# Patient Record
Sex: MALE | Race: WHITE | NOT HISPANIC OR LATINO | Employment: FULL TIME | URBAN - METROPOLITAN AREA
[De-identification: names, ages, dates, MRNs, and addresses within clinical notes are randomized per-mention and may not be internally consistent; named-entity substitution may affect disease eponyms.]

---

## 2018-02-15 ENCOUNTER — HOSPITAL ENCOUNTER (EMERGENCY)
Facility: CLINIC | Age: 53
Discharge: HOME | End: 2018-02-15
Attending: EMERGENCY MEDICINE | Admitting: EMERGENCY MEDICINE
Payer: COMMERCIAL

## 2018-02-15 ENCOUNTER — APPOINTMENT (OUTPATIENT)
Dept: GENERAL RADIOLOGY | Facility: CLINIC | Age: 53
End: 2018-02-15
Attending: EMERGENCY MEDICINE
Payer: COMMERCIAL

## 2018-02-15 VITALS
DIASTOLIC BLOOD PRESSURE: 104 MMHG | WEIGHT: 220 LBS | BODY MASS INDEX: 29.8 KG/M2 | RESPIRATION RATE: 16 BRPM | HEIGHT: 72 IN | OXYGEN SATURATION: 96 % | SYSTOLIC BLOOD PRESSURE: 154 MMHG | TEMPERATURE: 97.8 F

## 2018-02-15 DIAGNOSIS — R07.9 CHEST PAIN, UNSPECIFIED TYPE: ICD-10-CM

## 2018-02-15 DIAGNOSIS — F41.9 ANXIETY: ICD-10-CM

## 2018-02-15 DIAGNOSIS — F10.10 ALCOHOL ABUSE: ICD-10-CM

## 2018-02-15 LAB
ANION GAP SERPL CALCULATED.3IONS-SCNC: 11 MMOL/L (ref 3–14)
BASOPHILS # BLD AUTO: 0.1 10E9/L (ref 0–0.2)
BASOPHILS NFR BLD AUTO: 1.1 %
BUN SERPL-MCNC: 4 MG/DL (ref 7–30)
CALCIUM SERPL-MCNC: 8.9 MG/DL (ref 8.5–10.1)
CHLORIDE SERPL-SCNC: 98 MMOL/L (ref 94–109)
CO2 SERPL-SCNC: 24 MMOL/L (ref 20–32)
CREAT SERPL-MCNC: 0.84 MG/DL (ref 0.66–1.25)
DIFFERENTIAL METHOD BLD: ABNORMAL
EOSINOPHIL # BLD AUTO: 0.1 10E9/L (ref 0–0.7)
EOSINOPHIL NFR BLD AUTO: 1.3 %
ERYTHROCYTE [DISTWIDTH] IN BLOOD BY AUTOMATED COUNT: 12.8 % (ref 10–15)
ETHANOL SERPL-MCNC: 0.08 G/DL
GFR SERPL CREATININE-BSD FRML MDRD: >90 ML/MIN/1.7M2
GLUCOSE SERPL-MCNC: 75 MG/DL (ref 70–99)
HCT VFR BLD AUTO: 44.9 % (ref 40–53)
HGB BLD-MCNC: 16.6 G/DL (ref 13.3–17.7)
IMM GRANULOCYTES # BLD: 0 10E9/L (ref 0–0.4)
IMM GRANULOCYTES NFR BLD: 0.4 %
INTERPRETATION ECG - MUSE: NORMAL
LYMPHOCYTES # BLD AUTO: 0.9 10E9/L (ref 0.8–5.3)
LYMPHOCYTES NFR BLD AUTO: 18.5 %
MCH RBC QN AUTO: 32.6 PG (ref 26.5–33)
MCHC RBC AUTO-ENTMCNC: 37 G/DL (ref 31.5–36.5)
MCV RBC AUTO: 88 FL (ref 78–100)
MONOCYTES # BLD AUTO: 0.4 10E9/L (ref 0–1.3)
MONOCYTES NFR BLD AUTO: 8.6 %
NEUTROPHILS # BLD AUTO: 3.3 10E9/L (ref 1.6–8.3)
NEUTROPHILS NFR BLD AUTO: 70.1 %
NRBC # BLD AUTO: 0 10*3/UL
NRBC BLD AUTO-RTO: 0 /100
PLATELET # BLD AUTO: 177 10E9/L (ref 150–450)
POTASSIUM SERPL-SCNC: 4.1 MMOL/L (ref 3.4–5.3)
RBC # BLD AUTO: 5.09 10E12/L (ref 4.4–5.9)
SODIUM SERPL-SCNC: 133 MMOL/L (ref 133–144)
TROPONIN I SERPL-MCNC: <0.015 UG/L (ref 0–0.04)
WBC # BLD AUTO: 4.7 10E9/L (ref 4–11)

## 2018-02-15 PROCEDURE — 25000128 H RX IP 250 OP 636: Performed by: EMERGENCY MEDICINE

## 2018-02-15 PROCEDURE — 71046 X-RAY EXAM CHEST 2 VIEWS: CPT

## 2018-02-15 PROCEDURE — 85025 COMPLETE CBC W/AUTO DIFF WBC: CPT | Performed by: EMERGENCY MEDICINE

## 2018-02-15 PROCEDURE — 93005 ELECTROCARDIOGRAM TRACING: CPT

## 2018-02-15 PROCEDURE — 84484 ASSAY OF TROPONIN QUANT: CPT | Performed by: EMERGENCY MEDICINE

## 2018-02-15 PROCEDURE — 25000132 ZZH RX MED GY IP 250 OP 250 PS 637: Performed by: EMERGENCY MEDICINE

## 2018-02-15 PROCEDURE — 99285 EMERGENCY DEPT VISIT HI MDM: CPT | Mod: 25

## 2018-02-15 PROCEDURE — 96360 HYDRATION IV INFUSION INIT: CPT

## 2018-02-15 PROCEDURE — 80320 DRUG SCREEN QUANTALCOHOLS: CPT | Performed by: EMERGENCY MEDICINE

## 2018-02-15 PROCEDURE — 80048 BASIC METABOLIC PNL TOTAL CA: CPT | Performed by: EMERGENCY MEDICINE

## 2018-02-15 RX ORDER — LORAZEPAM 1 MG/1
1 TABLET ORAL EVERY 6 HOURS PRN
Qty: 8 TABLET | Refills: 0 | Status: SHIPPED | OUTPATIENT
Start: 2018-02-15 | End: 2023-08-14

## 2018-02-15 RX ORDER — TRAZODONE HYDROCHLORIDE 150 MG/1
300 TABLET ORAL AT BEDTIME
COMMUNITY

## 2018-02-15 RX ORDER — GABAPENTIN 300 MG/1
600 CAPSULE ORAL 3 TIMES DAILY
COMMUNITY

## 2018-02-15 RX ORDER — SODIUM CHLORIDE 9 MG/ML
1000 INJECTION, SOLUTION INTRAVENOUS CONTINUOUS
Status: DISCONTINUED | OUTPATIENT
Start: 2018-02-15 | End: 2018-02-15 | Stop reason: HOSPADM

## 2018-02-15 RX ORDER — ASPIRIN 81 MG/1
162 TABLET, CHEWABLE ORAL ONCE
Status: COMPLETED | OUTPATIENT
Start: 2018-02-15 | End: 2018-02-15

## 2018-02-15 RX ADMIN — ASPIRIN 81 MG 162 MG: 81 TABLET ORAL at 10:05

## 2018-02-15 RX ADMIN — SODIUM CHLORIDE 1000 ML: 9 INJECTION, SOLUTION INTRAVENOUS at 10:07

## 2018-02-15 ASSESSMENT — ENCOUNTER SYMPTOMS
NECK PAIN: 1
SEIZURES: 0
HALLUCINATIONS: 0
NERVOUS/ANXIOUS: 1

## 2018-02-15 NOTE — DISCHARGE INSTRUCTIONS
Return to the emergency department or seek medical care as instructed if your symptoms fail to improve or significantly worsen.    Recommend 81 mg aspirin daily    Take Ativan as needed for anxiety/withdrawal symptoms; use as directed    Follow-up as indicated on page 1.  Maintain adequate hydration and get plenty of rest.

## 2018-02-15 NOTE — ED AVS SNAPSHOT
Emergency Department    [address] [facility] [address]    Phone:  [phone]    Fax:  [phone]                                       Jorge Alberto Ernandez   MRN: [id number]    Department:   Emergency Department   Date of Visit:  2/15/2018           Patient Information     Date Of Birth          1965        Your diagnoses for this visit were:     Chest pain, unspecified type     Alcohol abuse     Anxiety        You were seen by Ja Santizo DO.      Follow-up Information     Follow up with Leonel Mendoza MD In 2 days.    Specialty:  Family Practice    Contact information:    [facility]  [address] [place] [id number] [phone]          Follow up with  Emergency Department.    Specialty:  EMERGENCY MEDICINE    Why:  If symptoms worsen    Contact information:    [address] [facility] [id number] [phone]        Discharge Instructions       Return to the emergency department or seek medical care as instructed if your symptoms fail to improve or significantly worsen.    Recommend 81 mg aspirin daily    Take Ativan as needed for anxiety/withdrawal symptoms; use as directed    Follow-up as indicated on page 1.  Maintain adequate hydration and get plenty of rest.      Discharge References/Attachments     ALCOHOL ABUSE (ENGLISH)    ANXIETY REACTION (ENGLISH)    CHEST PAIN, NONCARDIAC  (ENGLISH)      24 Hour Appointment Hotline       To make an appointment at any [facility], call [phone] ([phone]). If you don't have a family doctor or clinic, we will help you find one. [place] clinics are conveniently located to serve the needs of you and your family.             Review of your medicines      START taking        Dose / Directions Last dose taken    LORazepam 1 MG tablet   Commonly known as:  ATIVAN   Dose:  1 mg   Quantity:  8 tablet        Take 1 tablet (1 mg) by mouth every 6 hours as needed for anxiety  (withdrawl)   Refills:  0          Our records show that you are taking the medicines listed below. If these are incorrect, please call your family doctor or clinic.        Dose / Directions Last dose taken    albuterol 108 (90 BASE) MCG/ACT Inhaler   Commonly known as:  PROAIR HFA/PROVENTIL HFA/VENTOLIN HFA   Dose:  2 puff        Inhale 2 puffs into the lungs every 6 hours.   Refills:  0        GABAPENTIN PO        Refills:  0        SERTRALINE HCL PO        Take by mouth daily   Refills:  0        TRAZODONE HCL PO        Refills:  0                Prescriptions were sent or printed at these locations (1 Prescription)                   Other Prescriptions                Printed at Department/Unit printer (1 of 1)         LORazepam (ATIVAN) 1 MG tablet                Procedures and tests performed during your visit     Alcohol level blood    Basic metabolic panel    CBC with platelets differential    EKG 12 lead    Troponin I    XR Chest 2 Views      Orders Needing Specimen Collection     None      Pending Results     Date and Time Order Name Status Description    2/15/2018 0959 XR Chest 2 Views Preliminary             Pending Culture Results     No orders found from 2/13/2018 to 2/16/2018.            Pending Results Instructions     If you had any lab results that were not finalized at the time of your Discharge, you can call the ED Lab Result RN at [phone]. You will be contacted by this team for any positive Lab results or changes in treatment. The nurses are available 7 days a week from 10A to 6:30P.  You can leave a message 24 hours per day and they will return your call.        Test Results From Your Hospital Stay        2/15/2018 10:10 AM      Component Results     Component Value Ref Range & Units Status    WBC 4.7 4.0 - 11.0 10e9/L Final    RBC Count 5.09 4.4 - 5.9 10e12/L Final    Hemoglobin 16.6 13.3 - 17.7 g/dL Final    Hematocrit 44.9 40.0 - 53.0 % Final    MCV 88 78 - 100 fl Final    MCH 32.6 26.5  - 33.0 pg Final    MCHC 37.0 (H) 31.5 - 36.5 g/dL Final    RDW 12.8 10.0 - 15.0 % Final    Platelet Count 177 150 - 450 10e9/L Final    Diff Method Automated Method  Final    % Neutrophils 70.1 % Final    % Lymphocytes 18.5 % Final    % Monocytes 8.6 % Final    % Eosinophils 1.3 % Final    % Basophils 1.1 % Final    % Immature Granulocytes 0.4 % Final    Nucleated RBCs 0 0 /100 Final    Absolute Neutrophil 3.3 1.6 - 8.3 10e9/L Final    Absolute Lymphocytes 0.9 0.8 - 5.3 10e9/L Final    Absolute Monocytes 0.4 0.0 - 1.3 10e9/L Final    Absolute Eosinophils 0.1 0.0 - 0.7 10e9/L Final    Absolute Basophils 0.1 0.0 - 0.2 10e9/L Final    Abs Immature Granulocytes 0.0 0 - 0.4 10e9/L Final    Absolute Nucleated RBC 0.0  Final         2/15/2018 10:24 AM      Component Results     Component Value Ref Range & Units Status    Sodium 133 133 - 144 mmol/L Final    Potassium 4.1 3.4 - 5.3 mmol/L Final    Chloride 98 94 - 109 mmol/L Final    Carbon Dioxide 24 20 - 32 mmol/L Final    Anion Gap 11 3 - 14 mmol/L Final    Glucose 75 70 - 99 mg/dL Final    Urea Nitrogen 4 (L) 7 - 30 mg/dL Final    Creatinine 0.84 0.66 - 1.25 mg/dL Final    GFR Estimate >90 >60 mL/min/1.7m2 Final    Non  GFR Calc    GFR Estimate If Black >90 >60 mL/min/1.7m2 Final    African American GFR Calc    Calcium 8.9 8.5 - 10.1 mg/dL Final         2/15/2018 10:29 AM      Component Results     Component Value Ref Range & Units Status    Troponin I ES <0.015 0.000 - 0.045 ug/L Final    The 99th percentile for upper reference range is 0.045 ug/L.  Troponin values   in the range of 0.045 - 0.120 ug/L may be associated with risks of adverse   clinical events.           2/15/2018 10:30 AM      Narrative     CHEST TWO VIEWS  2/15/2018 10:26 AM    HISTORY:  Chest pain.     COMPARISON:  None.        Impression     IMPRESSION:  No infiltrates or acute process. Chest is negative other  than degenerative changes of the spine.          2/15/2018 10:24 AM       Component Results     Component Value Ref Range & Units Status    Ethanol g/dL 0.08 (H) <0.01 g/dL Final                Clinical Quality Measure: Blood Pressure Screening     Your blood pressure was checked while you were in the emergency department today. The last reading we obtained was  BP: (!) 160/98 . Please read the guidelines below about what these numbers mean and what you should do about them.  If your systolic blood pressure (the top number) is less than 120 and your diastolic blood pressure (the bottom number) is less than 80, then your blood pressure is normal. There is nothing more that you need to do about it.  If your systolic blood pressure (the top number) is 120-139 or your diastolic blood pressure (the bottom number) is 80-89, your blood pressure may be higher than it should be. You should have your blood pressure rechecked within a year by a primary care provider.  If your systolic blood pressure (the top number) is 140 or greater or your diastolic blood pressure (the bottom number) is 90 or greater, you may have high blood pressure. High blood pressure is treatable, but if left untreated over time it can put you at risk for heart attack, stroke, or kidney failure. You should have your blood pressure rechecked by a primary care provider within the next 4 weeks.  If your provider in the emergency department today gave you specific instructions to follow-up with your doctor or provider even sooner than that, you should follow that instruction and not wait for up to 4 weeks for your follow-up visit.        Thank you for choosing [facility]       Thank you for choosing [facility] for your care. Our goal is always to provide you with excellent care. Hearing back from our patients is one way we can continue to improve our services. Please take a few minutes to complete the written survey that you may receive in the mail after you visit with us. Thank you!        [facility] Information     [employer] ([email or web address]) lets you  "send messages to your doctor, view your test results, renew your prescriptions, schedule appointments and more. To sign up, go to [email or web address] . Click on \"Log in\" on the left side of the screen, which will take you to the Welcome page. Then click on \"Sign up Now\" on the right side of the page.     You will be asked to enter the access code listed below, as well as some personal information. Please follow the directions to create your username and password.     Your access code is: [email or web address]  Expires: 2018 11:03 AM     Your access code will  in 90 days. If you need help or a new code, please call your [facility] clinic or [phone].        Care EveryWhere ID     This is your Care EveryWhere ID. This could be used by other organizations to access your [facility] medical records  [id number]        Equal Access to Services     GURWINDER VERMA : Hadii jolly Fontenot, waaxda lurene, qaybta kaalmada annie, ernesto duran . So [facility] [phone].    ATENCIÓN: Si habla español, tiene a brown disposición servicios gratuitos de asistencia lingüística. Llame al [phone].    We comply with applicable federal civil rights laws and [place] laws. We do not discriminate on the basis of race, color, national origin, age, disability, sex, sexual orientation, or gender identity.            After Visit Summary       This is your record. Keep this with you and show to your community pharmacist(s) and doctor(s) at your next visit.                  "

## 2018-02-15 NOTE — ED AVS SNAPSHOT
Emergency Department    [address] [place] [address]    Phone:  [phone]    Fax:  [phone]                                       Jorge Alberto Ernandez   MRN: [id number]    Department:   Emergency Department   Date of Visit:  2/15/2018           After Visit Summary Signature Page     I have received my discharge instructions, and my questions have been answered. I have discussed any challenges I see with this plan with the nurse or doctor.    ..........................................................................................................................................  Patient/Patient Representative Signature      ..........................................................................................................................................  Patient Representative Print Name and Relationship to Patient    ..................................................               ................................................  Date                                            Time    ..........................................................................................................................................  Reviewed by Signature/Title    ...................................................              ..............................................  Date                                                            Time

## 2018-02-15 NOTE — LETTER
February 15, 2018      To Whom It May Concern:      Jorge Alberto Ernandez was seen in our Emergency Department today, 02/15/18.  I expect his condition to improve over the next 2 days.  He may return to work/school when improved.    Sincerely,        Ja Santizo, DO

## 2018-02-15 NOTE — ED PROVIDER NOTES
History     Chief Complaint:  Chest pain     HPI:   The history is provided by the patient.      Jorge Alberto Ernandez is a 52 year old male with a history of anxiety and hypertension who presents to the emergency department for evaluation of chest pain. Of note, the patient is currently attempting to withdrawal off of alcohol after 3 weeks of daily drinking. He has not been at work this last week and has not been eating well secondary to feeling ill. He notes that his wife also has difficulty with sobriety which makes it harder for him to stay sober. His wife also has a brain tumor and he is her daily provider of cares, which has been causing him stress lately.  He had 2 beers this morning around 0500. He reports that yesterday in the AM he had onset of bilateral chest pain that slightly radiated into his neck. This has been intermittent since, with each episode lasting about 15 minutes; endorsing that it is not exacerbated with exertion. He has had this chest pain in the past but presents today out of concern for any cardiac problems. Here in the ED he says he currently feels well, but rates his pain a 4/10 in severity. He voices starting Gabapentin about a week ago for his anxiety, which has been helping. He denies any withdrawal seizures or hallucination and has no other complaints.     CARDIAC RISK FACTORS:  Sex:    Male   Tobacco:   Positive (former)  Hypertension:   Positive  Hyperlipidemia:  Negative   Diabetes:   Negative  Family History:  Negative    Allergies:  No known drug allergies     Medications:    Hydrochlorothiazide   Gabapentin   Sertraline   Trazodone   Albuterol inhaler     Past Medical History:    Asthma   Anxiety     Past Surgical History:    Sinus surgery     Family History:    Cerebrovascular disease - mother, father  Arthritis - mother, father   Lung cancer - father   Respiratory - father   DM - brother     Social History:  Presents with no one    Tobacco use: Former smoker quit 2012. Used to have  about 1.5 PPD  Alcohol use: Occasional   PCP: Leonel Mendoza MD    Heat admission   Marital Status:       Review of Systems   Cardiovascular: Positive for chest pain.   Musculoskeletal: Positive for neck pain.   Neurological: Negative for seizures.   Psychiatric/Behavioral: Negative for hallucinations. The patient is nervous/anxious.    All other systems reviewed and are negative.    Physical Exam     Patient Vitals for the past 24 hrs:   BP Temp Temp src Heart Rate Resp SpO2 Height Weight   02/15/18 1124 (!) 154/104 - - 97 - - - -   02/15/18 0929 (!) 160/98 97.8  F (36.6  C) Oral 105 16 96 % 1.829 m (6') 99.8 kg (220 lb)        Physical Exam  General: Alert and cooperative with exam. Patient in mild distress and anxious appearing. Normal mentation.  Head:  Scalp is NC/AT  Eyes:  No scleral icterus, PERRL,   ENT:  The external nose and ears are normal. The oropharynx is normal and without erythema; mucus membranes are moist. Uvula midline, no evidence of deep space infection.  Neck:  Normal range of motion without rigidity.  CV:  Mildly tachycardic and regular.     No pathologic murmur   Resp:  Breath sounds are clear bilaterally    Non-labored, no retractions or accessory muscle use  GI:  Abdomen is soft, no distension, no tenderness. No peritoneal signs. Obese   MS:  No lower extremity edema   Skin:  Warm and dry, No rash or lesions noted.  Neuro: A&O x 3, no motor defitis    Emergency Department Course   ECG (09:23:52):  Rate 108 bpm. CT interval 140. QRS duration 90. QT/QTc 342/458. P-R-T axes 57 26 61. Sinus tachycardia. Otherwise normal ECG. Agree with computer interpretation.  Interpreted at 0941 by Ja Santizo DO.     Imaging:  Radiographic findings were communicated with the patient who voiced understanding of the findings.     XR Chest, 2 views:  IMPRESSION:  No infiltrates or acute process. Chest is negative other than degenerative changes of the spine.     Imaging  independently reviewed and agree with radiologist interpretation.     Laboratory:  CBC: WNL (WBC 4.7, HGB 16.6, )    BUN 4 (low), ow BMP: WNL (Creatinine 0.84)   1000: Troponin I: <0.015    Alcohol level: 0.08 (high)     Interventions:  1005: Aspirin 162 mg PO  1007: NS 1L IV Bolus         Emergency Department Course:  Past medical records, nursing notes, and vitals reviewed.  0948: I performed an exam of the patient and obtained history, as documented above.     IV inserted and blood drawn. This was sent to the lab for further testing, results above.   Above interventions provided.      The patient was sent for a XR while in the emergency department, findings above.     I personally reviewed the laboratory results with the Patient and answered all related questions prior to discharge.        1049: I rechecked the patient. Findings and plan explained to the Patient. Patient discharged home with instructions regarding supportive care, medications, and reasons to return. The importance of close follow-up was reviewed.      Impression & Plan      Medical Decision Making:  Jorge Alberto Ernandez is a 52 year old male who presents with intermittent chest pain, alcohol abuse and anxiety. Patients's medical history and records were reviewed. Initial consideration for, but not limited, alcohol intoxication/withdrawal, ACS/MI, esophageal spasm/GERD, anxiety, MSK pain, gastritis, among others. Labs, EKG, and imaging was obtained. EKG demonstrates sinus tachycardia without evidence of ischemia, infarction, or other significant arrhythmia. Labs essentially unremarkable as noted above including negative troponin with exception of mildly elevated ethanol level (0.08). Patient was provided 162 mg Aspirin. He has not had further episodes of chest pain while here in the ED. HEART score = 3. At this time, cardiac etiology seems unlikely and will defer stress testing at this time. Patient was provided a short course of Ativan for  withdrawal symptoms/anxiety. No history of complicated withdrawal symptoms. Home care instruction and return precautions were discussed. Patient to follow up closely with PCP in 2 days for reevaluation. Patient discharged to home. At the time of discharge patient was hemodynamically stable, neurologically intact, pain free, and clinically sober.     Diagnosis:    ICD-10-CM    1. Chest pain, unspecified type R07.9    2. Alcohol abuse F10.10    3. Anxiety F41.9        Disposition:  Discharged to home with plan as outlined.      Discharge Medications:  New Prescriptions    LORAZEPAM (ATIVAN) 1 MG TABLET    Take 1 tablet (1 mg) by mouth every 6 hours as needed for anxiety (withdrawl)     Scribe Disclosure:   IEmmanuel, am serving as a scribe at 9:48 AM on 2/15/2018 to document services personally performed by Ja Santizo DO based on my observations and the provider's statements to me.       Emmanuel Pan  2/15/2018    EMERGENCY DEPARTMENT       Ja Santizo DO  02/15/18 3156

## 2020-11-14 ENCOUNTER — HEALTH MAINTENANCE LETTER (OUTPATIENT)
Age: 55
End: 2020-11-14

## 2021-09-12 ENCOUNTER — HEALTH MAINTENANCE LETTER (OUTPATIENT)
Age: 56
End: 2021-09-12

## 2022-01-02 ENCOUNTER — HEALTH MAINTENANCE LETTER (OUTPATIENT)
Age: 57
End: 2022-01-02

## 2022-11-19 ENCOUNTER — HEALTH MAINTENANCE LETTER (OUTPATIENT)
Age: 57
End: 2022-11-19

## 2023-04-09 ENCOUNTER — HEALTH MAINTENANCE LETTER (OUTPATIENT)
Age: 58
End: 2023-04-09

## 2023-08-14 ENCOUNTER — HOSPITAL ENCOUNTER (INPATIENT)
Facility: CLINIC | Age: 58
LOS: 3 days | Discharge: HOME | DRG: 872 | End: 2023-08-17
Attending: EMERGENCY MEDICINE | Admitting: STUDENT IN AN ORGANIZED HEALTH CARE EDUCATION/TRAINING PROGRAM
Payer: COMMERCIAL

## 2023-08-14 ENCOUNTER — APPOINTMENT (OUTPATIENT)
Dept: CT IMAGING | Facility: CLINIC | Age: 58
DRG: 872 | End: 2023-08-14
Attending: EMERGENCY MEDICINE
Payer: COMMERCIAL

## 2023-08-14 ENCOUNTER — NURSE TRIAGE (OUTPATIENT)
Dept: NURSING | Facility: CLINIC | Age: 58
End: 2023-08-14
Payer: COMMERCIAL

## 2023-08-14 ENCOUNTER — APPOINTMENT (OUTPATIENT)
Dept: GENERAL RADIOLOGY | Facility: CLINIC | Age: 58
DRG: 872 | End: 2023-08-14
Attending: EMERGENCY MEDICINE
Payer: COMMERCIAL

## 2023-08-14 DIAGNOSIS — L03.116 CELLULITIS OF LEFT LOWER EXTREMITY: ICD-10-CM

## 2023-08-14 DIAGNOSIS — E87.6 HYPOKALEMIA: ICD-10-CM

## 2023-08-14 DIAGNOSIS — E83.51 HYPOCALCEMIA: ICD-10-CM

## 2023-08-14 DIAGNOSIS — R53.1 WEAKNESS: ICD-10-CM

## 2023-08-14 DIAGNOSIS — R52 PAIN: Primary | ICD-10-CM

## 2023-08-14 DIAGNOSIS — F10.10 ALCOHOL ABUSE: ICD-10-CM

## 2023-08-14 DIAGNOSIS — E83.42 HYPOMAGNESEMIA: ICD-10-CM

## 2023-08-14 DIAGNOSIS — F10.939 ALCOHOL WITHDRAWAL SYNDROME WITH COMPLICATION (H): ICD-10-CM

## 2023-08-14 PROBLEM — R56.9 ALCOHOL WITHDRAWAL SEIZURE WITH COMPLICATION (H): Status: ACTIVE | Noted: 2023-08-14

## 2023-08-14 LAB
ALBUMIN SERPL BCG-MCNC: 2.5 G/DL (ref 3.5–5.2)
ALBUMIN UR-MCNC: 20 MG/DL
ALP SERPL-CCNC: 48 U/L (ref 40–129)
ALT SERPL W P-5'-P-CCNC: 35 U/L (ref 0–70)
ANION GAP SERPL CALCULATED.3IONS-SCNC: 16 MMOL/L (ref 7–15)
APPEARANCE UR: CLEAR
AST SERPL W P-5'-P-CCNC: 80 U/L (ref 0–45)
ATRIAL RATE - MUSE: 114 BPM
BASOPHILS # BLD AUTO: 0.1 10E3/UL (ref 0–0.2)
BASOPHILS NFR BLD AUTO: 1 %
BILIRUB SERPL-MCNC: 0.3 MG/DL
BILIRUB UR QL STRIP: NEGATIVE
BUN SERPL-MCNC: 4.3 MG/DL (ref 6–20)
CA-I BLD-MCNC: 4.3 MG/DL (ref 4.4–5.2)
CALCIUM SERPL-MCNC: 5.6 MG/DL (ref 8.6–10)
CHLORIDE SERPL-SCNC: 102 MMOL/L (ref 98–107)
COLOR UR AUTO: ABNORMAL
CREAT SERPL-MCNC: 0.68 MG/DL (ref 0.67–1.17)
DEPRECATED HCO3 PLAS-SCNC: 15 MMOL/L (ref 22–29)
DIASTOLIC BLOOD PRESSURE - MUSE: NORMAL MMHG
EOSINOPHIL # BLD AUTO: 0.1 10E3/UL (ref 0–0.7)
EOSINOPHIL NFR BLD AUTO: 0 %
ERYTHROCYTE [DISTWIDTH] IN BLOOD BY AUTOMATED COUNT: 12.6 % (ref 10–15)
ETHANOL SERPL-MCNC: 0.01 G/DL
GFR SERPL CREATININE-BSD FRML MDRD: >90 ML/MIN/1.73M2
GLUCOSE SERPL-MCNC: 70 MG/DL (ref 70–99)
GLUCOSE UR STRIP-MCNC: NEGATIVE MG/DL
HCT VFR BLD AUTO: 45.7 % (ref 40–53)
HGB BLD-MCNC: 16.6 G/DL (ref 13.3–17.7)
HGB UR QL STRIP: ABNORMAL
HOLD SPECIMEN: NORMAL
HOLD SPECIMEN: NORMAL
IMM GRANULOCYTES # BLD: 0.2 10E3/UL
IMM GRANULOCYTES NFR BLD: 1 %
INTERPRETATION ECG - MUSE: NORMAL
KETONES UR STRIP-MCNC: NEGATIVE MG/DL
LACTATE SERPL-SCNC: 2.5 MMOL/L (ref 0.7–2)
LEUKOCYTE ESTERASE UR QL STRIP: NEGATIVE
LIPASE SERPL-CCNC: 9 U/L (ref 13–60)
LYMPHOCYTES # BLD AUTO: 0.5 10E3/UL (ref 0.8–5.3)
LYMPHOCYTES NFR BLD AUTO: 3 %
MAGNESIUM SERPL-MCNC: 1.3 MG/DL (ref 1.7–2.3)
MCH RBC QN AUTO: 32.3 PG (ref 26.5–33)
MCHC RBC AUTO-ENTMCNC: 36.3 G/DL (ref 31.5–36.5)
MCV RBC AUTO: 89 FL (ref 78–100)
MONOCYTES # BLD AUTO: 1.2 10E3/UL (ref 0–1.3)
MONOCYTES NFR BLD AUTO: 7 %
MUCOUS THREADS #/AREA URNS LPF: PRESENT /LPF
NEUTROPHILS # BLD AUTO: 15.4 10E3/UL (ref 1.6–8.3)
NEUTROPHILS NFR BLD AUTO: 88 %
NITRATE UR QL: NEGATIVE
NRBC # BLD AUTO: 0 10E3/UL
NRBC BLD AUTO-RTO: 0 /100
P AXIS - MUSE: 42 DEGREES
PH UR STRIP: 6.5 [PH] (ref 5–7)
PHOSPHATE SERPL-MCNC: 1.1 MG/DL (ref 2.5–4.5)
PLATELET # BLD AUTO: 159 10E3/UL (ref 150–450)
POTASSIUM SERPL-SCNC: 2.4 MMOL/L (ref 3.4–5.3)
PR INTERVAL - MUSE: 146 MS
PROCALCITONIN SERPL IA-MCNC: 0.43 NG/ML
PROT SERPL-MCNC: 4.3 G/DL (ref 6.4–8.3)
QRS DURATION - MUSE: 96 MS
QT - MUSE: 354 MS
QTC - MUSE: 487 MS
R AXIS - MUSE: 27 DEGREES
RBC # BLD AUTO: 5.14 10E6/UL (ref 4.4–5.9)
RBC URINE: <1 /HPF
SODIUM SERPL-SCNC: 133 MMOL/L (ref 136–145)
SP GR UR STRIP: 1 (ref 1–1.03)
SYSTOLIC BLOOD PRESSURE - MUSE: NORMAL MMHG
T AXIS - MUSE: 64 DEGREES
UROBILINOGEN UR STRIP-MCNC: NORMAL MG/DL
VENTRICULAR RATE- MUSE: 114 BPM
WBC # BLD AUTO: 17.5 10E3/UL (ref 4–11)
WBC URINE: 1 /HPF

## 2023-08-14 PROCEDURE — 250N000009 HC RX 250: Performed by: EMERGENCY MEDICINE

## 2023-08-14 PROCEDURE — 36415 COLL VENOUS BLD VENIPUNCTURE: CPT | Performed by: STUDENT IN AN ORGANIZED HEALTH CARE EDUCATION/TRAINING PROGRAM

## 2023-08-14 PROCEDURE — 70450 CT HEAD/BRAIN W/O DYE: CPT

## 2023-08-14 PROCEDURE — 84145 PROCALCITONIN (PCT): CPT | Performed by: EMERGENCY MEDICINE

## 2023-08-14 PROCEDURE — 82077 ASSAY SPEC XCP UR&BREATH IA: CPT | Performed by: EMERGENCY MEDICINE

## 2023-08-14 PROCEDURE — 83690 ASSAY OF LIPASE: CPT | Performed by: EMERGENCY MEDICINE

## 2023-08-14 PROCEDURE — 120N000001 HC R&B MED SURG/OB

## 2023-08-14 PROCEDURE — 250N000013 HC RX MED GY IP 250 OP 250 PS 637: Performed by: STUDENT IN AN ORGANIZED HEALTH CARE EDUCATION/TRAINING PROGRAM

## 2023-08-14 PROCEDURE — 71046 X-RAY EXAM CHEST 2 VIEWS: CPT

## 2023-08-14 PROCEDURE — 85025 COMPLETE CBC W/AUTO DIFF WBC: CPT | Performed by: EMERGENCY MEDICINE

## 2023-08-14 PROCEDURE — 258N000003 HC RX IP 258 OP 636: Performed by: EMERGENCY MEDICINE

## 2023-08-14 PROCEDURE — 93005 ELECTROCARDIOGRAM TRACING: CPT

## 2023-08-14 PROCEDURE — 84100 ASSAY OF PHOSPHORUS: CPT | Performed by: STUDENT IN AN ORGANIZED HEALTH CARE EDUCATION/TRAINING PROGRAM

## 2023-08-14 PROCEDURE — 81001 URINALYSIS AUTO W/SCOPE: CPT | Performed by: EMERGENCY MEDICINE

## 2023-08-14 PROCEDURE — 99223 1ST HOSP IP/OBS HIGH 75: CPT | Performed by: STUDENT IN AN ORGANIZED HEALTH CARE EDUCATION/TRAINING PROGRAM

## 2023-08-14 PROCEDURE — 250N000013 HC RX MED GY IP 250 OP 250 PS 637: Performed by: EMERGENCY MEDICINE

## 2023-08-14 PROCEDURE — 99285 EMERGENCY DEPT VISIT HI MDM: CPT | Mod: 25

## 2023-08-14 PROCEDURE — 87040 BLOOD CULTURE FOR BACTERIA: CPT | Performed by: EMERGENCY MEDICINE

## 2023-08-14 PROCEDURE — 83735 ASSAY OF MAGNESIUM: CPT | Performed by: EMERGENCY MEDICINE

## 2023-08-14 PROCEDURE — 36415 COLL VENOUS BLD VENIPUNCTURE: CPT | Performed by: EMERGENCY MEDICINE

## 2023-08-14 PROCEDURE — 250N000011 HC RX IP 250 OP 636: Mod: JZ | Performed by: EMERGENCY MEDICINE

## 2023-08-14 PROCEDURE — 80053 COMPREHEN METABOLIC PANEL: CPT | Performed by: EMERGENCY MEDICINE

## 2023-08-14 PROCEDURE — 258N000003 HC RX IP 258 OP 636: Performed by: HOSPITALIST

## 2023-08-14 PROCEDURE — HZ2ZZZZ DETOXIFICATION SERVICES FOR SUBSTANCE ABUSE TREATMENT: ICD-10-PCS | Performed by: STUDENT IN AN ORGANIZED HEALTH CARE EDUCATION/TRAINING PROGRAM

## 2023-08-14 PROCEDURE — 82330 ASSAY OF CALCIUM: CPT | Performed by: EMERGENCY MEDICINE

## 2023-08-14 PROCEDURE — 250N000011 HC RX IP 250 OP 636: Mod: JZ | Performed by: STUDENT IN AN ORGANIZED HEALTH CARE EDUCATION/TRAINING PROGRAM

## 2023-08-14 PROCEDURE — 83605 ASSAY OF LACTIC ACID: CPT | Performed by: STUDENT IN AN ORGANIZED HEALTH CARE EDUCATION/TRAINING PROGRAM

## 2023-08-14 RX ORDER — DIAZEPAM 10 MG/2ML
5-10 INJECTION, SOLUTION INTRAMUSCULAR; INTRAVENOUS EVERY 30 MIN PRN
Status: DISCONTINUED | OUTPATIENT
Start: 2023-08-14 | End: 2023-08-17 | Stop reason: HOSPADM

## 2023-08-14 RX ORDER — POTASSIUM CHLORIDE 1500 MG/1
40 TABLET, EXTENDED RELEASE ORAL ONCE
Status: COMPLETED | OUTPATIENT
Start: 2023-08-14 | End: 2023-08-14

## 2023-08-14 RX ORDER — ACETAMINOPHEN 325 MG/1
650 TABLET ORAL EVERY 6 HOURS PRN
Status: DISCONTINUED | OUTPATIENT
Start: 2023-08-14 | End: 2023-08-17 | Stop reason: HOSPADM

## 2023-08-14 RX ORDER — POLYETHYLENE GLYCOL 3350 17 G/17G
17 POWDER, FOR SOLUTION ORAL DAILY PRN
Status: DISCONTINUED | OUTPATIENT
Start: 2023-08-14 | End: 2023-08-17 | Stop reason: HOSPADM

## 2023-08-14 RX ORDER — LIDOCAINE 40 MG/G
CREAM TOPICAL
Status: DISCONTINUED | OUTPATIENT
Start: 2023-08-14 | End: 2023-08-17 | Stop reason: HOSPADM

## 2023-08-14 RX ORDER — CEFAZOLIN SODIUM 2 G/100ML
2 INJECTION, SOLUTION INTRAVENOUS ONCE
Status: COMPLETED | OUTPATIENT
Start: 2023-08-14 | End: 2023-08-14

## 2023-08-14 RX ORDER — ACETAMINOPHEN 500 MG
1000 TABLET ORAL ONCE
Status: COMPLETED | OUTPATIENT
Start: 2023-08-14 | End: 2023-08-14

## 2023-08-14 RX ORDER — GABAPENTIN 300 MG/1
900 CAPSULE ORAL EVERY 8 HOURS
Status: DISCONTINUED | OUTPATIENT
Start: 2023-08-15 | End: 2023-08-17 | Stop reason: HOSPADM

## 2023-08-14 RX ORDER — MULTIPLE VITAMINS W/ MINERALS TAB 9MG-400MCG
1 TAB ORAL DAILY
Status: DISCONTINUED | OUTPATIENT
Start: 2023-08-15 | End: 2023-08-17 | Stop reason: HOSPADM

## 2023-08-14 RX ORDER — HYDROXYZINE HYDROCHLORIDE 25 MG/1
25 TABLET, FILM COATED ORAL
COMMUNITY
Start: 2023-02-24

## 2023-08-14 RX ORDER — HALOPERIDOL 5 MG/ML
2.5-5 INJECTION INTRAMUSCULAR EVERY 6 HOURS PRN
Status: DISCONTINUED | OUTPATIENT
Start: 2023-08-14 | End: 2023-08-17 | Stop reason: HOSPADM

## 2023-08-14 RX ORDER — ONDANSETRON 4 MG/1
4 TABLET, ORALLY DISINTEGRATING ORAL EVERY 6 HOURS PRN
Status: DISCONTINUED | OUTPATIENT
Start: 2023-08-14 | End: 2023-08-17 | Stop reason: HOSPADM

## 2023-08-14 RX ORDER — HYDROCHLOROTHIAZIDE 50 MG/1
1 TABLET ORAL DAILY
COMMUNITY
Start: 2023-03-28

## 2023-08-14 RX ORDER — BISACODYL 10 MG
10 SUPPOSITORY, RECTAL RECTAL DAILY PRN
Status: DISCONTINUED | OUTPATIENT
Start: 2023-08-14 | End: 2023-08-17 | Stop reason: HOSPADM

## 2023-08-14 RX ORDER — GABAPENTIN 300 MG/1
600 CAPSULE ORAL EVERY 8 HOURS
Status: DISCONTINUED | OUTPATIENT
Start: 2023-08-18 | End: 2023-08-16

## 2023-08-14 RX ORDER — AMOXICILLIN 250 MG
1 CAPSULE ORAL 2 TIMES DAILY PRN
Status: DISCONTINUED | OUTPATIENT
Start: 2023-08-14 | End: 2023-08-17 | Stop reason: HOSPADM

## 2023-08-14 RX ORDER — LORAZEPAM 2 MG/ML
1 INJECTION INTRAMUSCULAR ONCE
Status: COMPLETED | OUTPATIENT
Start: 2023-08-14 | End: 2023-08-14

## 2023-08-14 RX ORDER — CEFAZOLIN SODIUM 1 G/3ML
1 INJECTION, POWDER, FOR SOLUTION INTRAMUSCULAR; INTRAVENOUS EVERY 8 HOURS
Status: DISCONTINUED | OUTPATIENT
Start: 2023-08-15 | End: 2023-08-16

## 2023-08-14 RX ORDER — POTASSIUM CHLORIDE 7.45 MG/ML
10 INJECTION INTRAVENOUS
Status: COMPLETED | OUTPATIENT
Start: 2023-08-14 | End: 2023-08-15

## 2023-08-14 RX ORDER — ACETAMINOPHEN 650 MG/1
650 SUPPOSITORY RECTAL EVERY 6 HOURS PRN
Status: DISCONTINUED | OUTPATIENT
Start: 2023-08-14 | End: 2023-08-17 | Stop reason: HOSPADM

## 2023-08-14 RX ORDER — PROCHLORPERAZINE 25 MG
25 SUPPOSITORY, RECTAL RECTAL EVERY 12 HOURS PRN
Status: DISCONTINUED | OUTPATIENT
Start: 2023-08-14 | End: 2023-08-17 | Stop reason: HOSPADM

## 2023-08-14 RX ORDER — POTASSIUM CHLORIDE 7.45 MG/ML
10 INJECTION INTRAVENOUS ONCE
Status: COMPLETED | OUTPATIENT
Start: 2023-08-14 | End: 2023-08-14

## 2023-08-14 RX ORDER — FLUMAZENIL 0.1 MG/ML
0.2 INJECTION, SOLUTION INTRAVENOUS
Status: DISCONTINUED | OUTPATIENT
Start: 2023-08-14 | End: 2023-08-17 | Stop reason: HOSPADM

## 2023-08-14 RX ORDER — PROCHLORPERAZINE MALEATE 5 MG
10 TABLET ORAL EVERY 6 HOURS PRN
Status: DISCONTINUED | OUTPATIENT
Start: 2023-08-14 | End: 2023-08-17 | Stop reason: HOSPADM

## 2023-08-14 RX ORDER — IBUPROFEN 200 MG
600 TABLET ORAL EVERY 8 HOURS PRN
Status: ON HOLD | COMMUNITY
End: 2023-08-17

## 2023-08-14 RX ORDER — FOLIC ACID 1 MG/1
1 TABLET ORAL DAILY
Status: DISCONTINUED | OUTPATIENT
Start: 2023-08-15 | End: 2023-08-17 | Stop reason: HOSPADM

## 2023-08-14 RX ORDER — GABAPENTIN 100 MG/1
100 CAPSULE ORAL EVERY 8 HOURS
Status: DISCONTINUED | OUTPATIENT
Start: 2023-08-22 | End: 2023-08-16

## 2023-08-14 RX ORDER — MAGNESIUM SULFATE HEPTAHYDRATE 40 MG/ML
2 INJECTION, SOLUTION INTRAVENOUS ONCE
Status: COMPLETED | OUTPATIENT
Start: 2023-08-14 | End: 2023-08-14

## 2023-08-14 RX ORDER — AMOXICILLIN 250 MG
2 CAPSULE ORAL 2 TIMES DAILY PRN
Status: DISCONTINUED | OUTPATIENT
Start: 2023-08-14 | End: 2023-08-17 | Stop reason: HOSPADM

## 2023-08-14 RX ORDER — ONDANSETRON 2 MG/ML
4 INJECTION INTRAMUSCULAR; INTRAVENOUS EVERY 6 HOURS PRN
Status: DISCONTINUED | OUTPATIENT
Start: 2023-08-14 | End: 2023-08-17 | Stop reason: HOSPADM

## 2023-08-14 RX ORDER — DIAZEPAM 5 MG
10 TABLET ORAL EVERY 30 MIN PRN
Status: DISCONTINUED | OUTPATIENT
Start: 2023-08-14 | End: 2023-08-17 | Stop reason: HOSPADM

## 2023-08-14 RX ORDER — OLANZAPINE 5 MG/1
5-10 TABLET, ORALLY DISINTEGRATING ORAL EVERY 6 HOURS PRN
Status: DISCONTINUED | OUTPATIENT
Start: 2023-08-14 | End: 2023-08-17 | Stop reason: HOSPADM

## 2023-08-14 RX ORDER — GABAPENTIN 300 MG/1
300 CAPSULE ORAL EVERY 8 HOURS
Status: DISCONTINUED | OUTPATIENT
Start: 2023-08-20 | End: 2023-08-16

## 2023-08-14 RX ORDER — CLONIDINE HYDROCHLORIDE 0.1 MG/1
0.1 TABLET ORAL EVERY 8 HOURS
Status: DISCONTINUED | OUTPATIENT
Start: 2023-08-14 | End: 2023-08-17 | Stop reason: HOSPADM

## 2023-08-14 RX ORDER — GABAPENTIN 600 MG/1
1200 TABLET ORAL ONCE
Status: COMPLETED | OUTPATIENT
Start: 2023-08-14 | End: 2023-08-14

## 2023-08-14 RX ADMIN — MAGNESIUM SULFATE HEPTAHYDRATE 2 G: 40 INJECTION, SOLUTION INTRAVENOUS at 21:39

## 2023-08-14 RX ADMIN — POTASSIUM CHLORIDE 10 MEQ: 7.46 INJECTION, SOLUTION INTRAVENOUS at 22:27

## 2023-08-14 RX ADMIN — POTASSIUM CHLORIDE 40 MEQ: 1500 TABLET, EXTENDED RELEASE ORAL at 20:38

## 2023-08-14 RX ADMIN — LORAZEPAM 1 MG: 2 INJECTION INTRAMUSCULAR; INTRAVENOUS at 20:47

## 2023-08-14 RX ADMIN — FOLIC ACID: 5 INJECTION, SOLUTION INTRAMUSCULAR; INTRAVENOUS; SUBCUTANEOUS at 20:45

## 2023-08-14 RX ADMIN — SODIUM CHLORIDE 250 ML: 9 INJECTION, SOLUTION INTRAVENOUS at 23:40

## 2023-08-14 RX ADMIN — POTASSIUM CHLORIDE 10 MEQ: 7.46 INJECTION, SOLUTION INTRAVENOUS at 23:40

## 2023-08-14 RX ADMIN — GABAPENTIN 1200 MG: 600 TABLET, FILM COATED ORAL at 22:27

## 2023-08-14 RX ADMIN — POTASSIUM CHLORIDE 10 MEQ: 7.46 INJECTION, SOLUTION INTRAVENOUS at 20:47

## 2023-08-14 RX ADMIN — CLONIDINE HYDROCHLORIDE 0.1 MG: 0.1 TABLET ORAL at 22:27

## 2023-08-14 RX ADMIN — ACETAMINOPHEN 1000 MG: 500 TABLET ORAL at 20:38

## 2023-08-14 RX ADMIN — POTASSIUM CHLORIDE 40 MEQ: 1500 TABLET, EXTENDED RELEASE ORAL at 23:13

## 2023-08-14 RX ADMIN — CEFAZOLIN SODIUM 2 G: 2 INJECTION, SOLUTION INTRAVENOUS at 21:10

## 2023-08-14 ASSESSMENT — ACTIVITIES OF DAILY LIVING (ADL)
ADLS_ACUITY_SCORE: 35

## 2023-08-14 NOTE — ED PROVIDER NOTES
History     Chief Complaint:  Alcohol Intoxication     HPI   Jorge Alberto Ernandez is a 58 year old male with history of alcohol abuse who presents to the ED via EMS for alcohol intoxication. The patient reports he is a daily alcohol drinker for the last two years, with roughly 12 beers consumed a day. He was feeling well yesterday and consumed his normal amount of alcohol. The patient states he went to work today when he became weak and shaky. The patient went home early from work due to his symptoms and reports he fell due his tremors and weakness. He was not able to stand but crawled to the couch and called EMS. Benadryl was administered in route. He states he has been urinating frequently for the last few days. He denies nausea, vomiting, diarrhea, abdominal pain, seizures, suicidal ideation, or homicidal ideation.    Independent Historian:   None - Patient Only    Review of External Notes:   na     Medications:    Neurontin  Atarax  Norco  Desyrel  Flexeril  Oretic    Past Medical History:    Asthma  Alcohol abuse  Anxiety  Hyperlipidemia  Hypertension  Major Depressive Disorder    Past Surgical History:    Sinus Surgery     Physical Exam   Patient Vitals for the past 24 hrs:   BP Temp Temp src Pulse Resp SpO2 Weight   08/14/23 1846 -- -- -- -- -- -- 120.2 kg (265 lb)   08/14/23 1839 (!) 144/80 (!) 100.8  F (38.2  C) Temporal (!) 124 14 96 % --        Physical Exam  GENERAL: well developed, pleasant, tremulous   HEAD: atraumatic  EYES: pupils reactive, extraocular muscles intact, conjunctivae normal  ENT:  mucus membranes moist  NECK:  trachea midline, normal range of motion  RESPIRATORY: no tachypnea, breath sounds clear to auscultation   CVS: normal S1/S2, no murmurs, intact distal pulses  ABDOMEN: soft, nontender, nondistention  MUSCULOSKELETAL: no deformities  SKIN: warm and dry, no acute rashes or ulceration. Redness and warmth to left lower leg  NEURO: GCS 15, cranial nerves intact, alert and oriented x3  PSYCH:   Mood/affect normal    Emergency Department Course   ECG  ECG taken at 2015, ECG read at 2017  Sinus Tachycardia  Otherwise normal ECG   Rate 114 bpm. MT interval 146 ms. QRS duration 96 ms. QT/QTc 354/487 ms. P-R-T axes 42/27/64.     Imaging:  CT Head w/o Contrast   Final Result   IMPRESSION:   1.  No acute intracranial process.      XR Chest 2 Views    (Results Pending)      Report per radiology    Laboratory:  Labs Ordered and Resulted from Time of ED Arrival to Time of ED Departure   COMPREHENSIVE METABOLIC PANEL - Abnormal       Result Value    Sodium 133 (*)     Potassium 2.4 (*)     Chloride 102      Carbon Dioxide (CO2) 15 (*)     Anion Gap 16 (*)     Urea Nitrogen 4.3 (*)     Creatinine 0.68      Calcium 5.6 (*)     Glucose 70      Alkaline Phosphatase 48      AST 80 (*)     ALT 35      Protein Total 4.3 (*)     Albumin 2.5 (*)     Bilirubin Total 0.3      GFR Estimate >90     MAGNESIUM - Abnormal    Magnesium 1.3 (*)    LIPASE - Abnormal    Lipase 9 (*)    CBC WITH PLATELETS AND DIFFERENTIAL - Abnormal    WBC Count 17.5 (*)     RBC Count 5.14      Hemoglobin 16.6      Hematocrit 45.7      MCV 89      MCH 32.3      MCHC 36.3      RDW 12.6      Platelet Count 159      % Neutrophils 88      % Lymphocytes 3      % Monocytes 7      % Eosinophils 0      % Basophils 1      % Immature Granulocytes 1      NRBCs per 100 WBC 0      Absolute Neutrophils 15.4 (*)     Absolute Lymphocytes 0.5 (*)     Absolute Monocytes 1.2      Absolute Eosinophils 0.1      Absolute Basophils 0.1      Absolute Immature Granulocytes 0.2      Absolute NRBCs 0.0     PROCALCITONIN - Abnormal    Procalcitonin 0.43 (*)    ETHYL ALCOHOL LEVEL - Normal    Alcohol ethyl 0.01     ROUTINE UA WITH MICROSCOPIC REFLEX TO CULTURE   IONIZED CALCIUM   PHOSPHORUS   BLOOD CULTURE   BLOOD CULTURE      Emergency Department Course & Assessments:     Interventions:  Medications   acetaminophen (TYLENOL) tablet 1,000 mg (has no administration in time range)    sodium chloride 0.9 % 1,000 mL with Infuvite Adult 10 mL, thiamine 100 mg, folic acid 1 mg infusion (has no administration in time range)   LORazepam (ATIVAN) injection 1 mg (has no administration in time range)   ceFAZolin (ANCEF) 2 g in 100 mL D5W intermittent infusion (has no administration in time range)   magnesium sulfate 2 g in 50 mL sterile water intermittent infusion (has no administration in time range)   potassium chloride 10 mEq in 100 mL sterile water infusion (has no administration in time range)   potassium chloride ER (KLOR-CON M) CR tablet 40 mEq (has no administration in time range)      Assessments:  1832 Initial Examination    Independent Interpretation (X-rays, CTs, rhythm strip):  None    Consultations/Discussion of Management or Tests:  2009 I discussed the patient with Dr. Valenzuela, Hospitalist      Social Determinants of Health affecting care:   None and Stress/Adjustment Disorders    Disposition:  The patient was admitted to the hospital under the care of Dr. Valenzuela.     Impression & Plan    CMS Diagnoses: None    Medical Decision Making:  Jorge Alberto Ernandez is a 58 year old male presents with weakness and shaking to the point that he has fallen and was unable to walk.  Patient has a low-grade temp of 100.8 but denies any real infectious symptoms.  He does have some signs of cellulitis on the left lower leg.  Labs show hypokalemia, hypomagnesemia and hypocalcemia.  Ionized calcium is pending.  Patient was given antibiotics for cellulitis as his white count was quite elevated as well as potassium and magnesium.  Patient be admitted for ongoing treatment and work-up.    Diagnosis:    ICD-10-CM    1. Alcohol abuse  F10.10       2. Hypokalemia  E87.6       3. Hypomagnesemia  E83.42       4. Hypocalcemia  E83.51       5. Weakness  R53.1       6. Cellulitis of left lower extremity  L03.116       7. Alcohol withdrawal syndrome with complication (H)  F10.939           Scribe Disclosure:  Daniel BLEVINS  Kwame, am serving as a scribe at 6:57 PM on 8/14/2023 to document services personally performed by Dickson Flowers MD based on my observations and the provider's statements to me.     8/14/2023   Carrie Warner MD;Tyrel*        Dickson Flowers MD  08/15/23 0052

## 2023-08-14 NOTE — ED TRIAGE NOTES
Patient presents via EMS. Per report, patient usually drinks 12 beers a day but today decided to try to quit cold turkey. Started experiencing tremors and had 3 beers. Worsening tremors throughout the day prompted him to call EMS. Denies seizure hx      Triage Assessment       Row Name 08/14/23 1846       Triage Assessment (Adult)    Airway WDL WDL       Respiratory WDL    Respiratory WDL WDL       Cardiac WDL    Cardiac WDL X;rhythm    Cardiac Rhythm ST       Peripheral/Neurovascular WDL    Peripheral Neurovascular WDL X  tremors       Cognitive/Neuro/Behavioral WDL    Cognitive/Neuro/Behavioral WDL X

## 2023-08-14 NOTE — TELEPHONE ENCOUNTER
Has problems with alcohol, drank a lot last week, Yesterday did not drink much. Today worked until about 12N at home at desk. Got shakes at Noon. Was able to walk to kitchen. About an hour ago unable to wt bear, legs and hands very weak, Fell and has not tried to stand up again. Shakes still present in arms and legs. Had H/A earlier, advil and gabapentin helped H/A. No visual problem. Just now tried to stand up with use of a friends walker and unable to weight bear.    Protocol reviewed, Disposition: Call 911 now for evaluation. Patient agrees with plan. Call back with worsening symptoms, further questions/concerns.     KESHAV VILLALOBOS RN  [facility] nurse advisors  8/14/2023  5:26 PM  Reason for Disposition   [1] SEVERE weakness (i.e., unable to walk or barely able to walk, requires support) AND [2] new-onset or worsening    Protocols used: Neurologic Deficit-A-AH

## 2023-08-15 LAB
ANION GAP SERPL CALCULATED.3IONS-SCNC: 12 MMOL/L (ref 7–15)
BUN SERPL-MCNC: 6.1 MG/DL (ref 6–20)
CALCIUM SERPL-MCNC: 8.4 MG/DL (ref 8.6–10)
CHLORIDE SERPL-SCNC: 93 MMOL/L (ref 98–107)
CREAT SERPL-MCNC: 0.92 MG/DL (ref 0.67–1.17)
DEPRECATED HCO3 PLAS-SCNC: 27 MMOL/L (ref 22–29)
ERYTHROCYTE [DISTWIDTH] IN BLOOD BY AUTOMATED COUNT: 12.9 % (ref 10–15)
GFR SERPL CREATININE-BSD FRML MDRD: >90 ML/MIN/1.73M2
GLUCOSE SERPL-MCNC: 96 MG/DL (ref 70–99)
HCT VFR BLD AUTO: 47.2 % (ref 40–53)
HGB BLD-MCNC: 16.7 G/DL (ref 13.3–17.7)
LACTATE SERPL-SCNC: 1.1 MMOL/L (ref 0.7–2)
LACTATE SERPL-SCNC: 1.6 MMOL/L (ref 0.7–2)
MAGNESIUM SERPL-MCNC: 2.7 MG/DL (ref 1.7–2.3)
MCH RBC QN AUTO: 32 PG (ref 26.5–33)
MCHC RBC AUTO-ENTMCNC: 35.4 G/DL (ref 31.5–36.5)
MCV RBC AUTO: 90 FL (ref 78–100)
PLATELET # BLD AUTO: 142 10E3/UL (ref 150–450)
POTASSIUM SERPL-SCNC: 3.5 MMOL/L (ref 3.4–5.3)
POTASSIUM SERPL-SCNC: 3.6 MMOL/L (ref 3.4–5.3)
RBC # BLD AUTO: 5.22 10E6/UL (ref 4.4–5.9)
SODIUM SERPL-SCNC: 132 MMOL/L (ref 136–145)
WBC # BLD AUTO: 10.1 10E3/UL (ref 4–11)

## 2023-08-15 PROCEDURE — 83605 ASSAY OF LACTIC ACID: CPT | Performed by: STUDENT IN AN ORGANIZED HEALTH CARE EDUCATION/TRAINING PROGRAM

## 2023-08-15 PROCEDURE — 36415 COLL VENOUS BLD VENIPUNCTURE: CPT | Performed by: STUDENT IN AN ORGANIZED HEALTH CARE EDUCATION/TRAINING PROGRAM

## 2023-08-15 PROCEDURE — 84132 ASSAY OF SERUM POTASSIUM: CPT | Performed by: HOSPITALIST

## 2023-08-15 PROCEDURE — 85027 COMPLETE CBC AUTOMATED: CPT | Performed by: STUDENT IN AN ORGANIZED HEALTH CARE EDUCATION/TRAINING PROGRAM

## 2023-08-15 PROCEDURE — 99233 SBSQ HOSP IP/OBS HIGH 50: CPT | Performed by: HOSPITALIST

## 2023-08-15 PROCEDURE — 250N000011 HC RX IP 250 OP 636: Performed by: STUDENT IN AN ORGANIZED HEALTH CARE EDUCATION/TRAINING PROGRAM

## 2023-08-15 PROCEDURE — 36415 COLL VENOUS BLD VENIPUNCTURE: CPT | Performed by: HOSPITALIST

## 2023-08-15 PROCEDURE — 83605 ASSAY OF LACTIC ACID: CPT | Performed by: HOSPITALIST

## 2023-08-15 PROCEDURE — 120N000001 HC R&B MED SURG/OB

## 2023-08-15 PROCEDURE — 258N000003 HC RX IP 258 OP 636: Performed by: STUDENT IN AN ORGANIZED HEALTH CARE EDUCATION/TRAINING PROGRAM

## 2023-08-15 PROCEDURE — 250N000013 HC RX MED GY IP 250 OP 250 PS 637: Performed by: STUDENT IN AN ORGANIZED HEALTH CARE EDUCATION/TRAINING PROGRAM

## 2023-08-15 PROCEDURE — 250N000009 HC RX 250: Performed by: STUDENT IN AN ORGANIZED HEALTH CARE EDUCATION/TRAINING PROGRAM

## 2023-08-15 PROCEDURE — 83735 ASSAY OF MAGNESIUM: CPT | Performed by: HOSPITALIST

## 2023-08-15 PROCEDURE — 84132 ASSAY OF SERUM POTASSIUM: CPT | Performed by: STUDENT IN AN ORGANIZED HEALTH CARE EDUCATION/TRAINING PROGRAM

## 2023-08-15 RX ADMIN — FOLIC ACID 1 MG: 1 TABLET ORAL at 08:02

## 2023-08-15 RX ADMIN — CEFAZOLIN 1 G: 1 INJECTION, POWDER, FOR SOLUTION INTRAMUSCULAR; INTRAVENOUS at 05:12

## 2023-08-15 RX ADMIN — GABAPENTIN 900 MG: 300 CAPSULE ORAL at 20:39

## 2023-08-15 RX ADMIN — GABAPENTIN 900 MG: 300 CAPSULE ORAL at 13:17

## 2023-08-15 RX ADMIN — MULTIPLE VITAMINS W/ MINERALS TAB 1 TABLET: TAB at 08:02

## 2023-08-15 RX ADMIN — CEFAZOLIN 1 G: 1 INJECTION, POWDER, FOR SOLUTION INTRAMUSCULAR; INTRAVENOUS at 12:23

## 2023-08-15 RX ADMIN — THIAMINE HCL TAB 100 MG 100 MG: 100 TAB at 08:02

## 2023-08-15 RX ADMIN — CLONIDINE HYDROCHLORIDE 0.1 MG: 0.1 TABLET ORAL at 20:39

## 2023-08-15 RX ADMIN — FOLIC ACID: 5 INJECTION, SOLUTION INTRAMUSCULAR; INTRAVENOUS; SUBCUTANEOUS at 07:59

## 2023-08-15 RX ADMIN — CLONIDINE HYDROCHLORIDE 0.1 MG: 0.1 TABLET ORAL at 05:12

## 2023-08-15 RX ADMIN — CLONIDINE HYDROCHLORIDE 0.1 MG: 0.1 TABLET ORAL at 13:17

## 2023-08-15 RX ADMIN — GABAPENTIN 900 MG: 300 CAPSULE ORAL at 05:12

## 2023-08-15 RX ADMIN — CEFAZOLIN 1 G: 1 INJECTION, POWDER, FOR SOLUTION INTRAMUSCULAR; INTRAVENOUS at 20:39

## 2023-08-15 ASSESSMENT — ACTIVITIES OF DAILY LIVING (ADL)
ADLS_ACUITY_SCORE: 37
ADLS_ACUITY_SCORE: 35
ADLS_ACUITY_SCORE: 37

## 2023-08-15 NOTE — PROGRESS NOTES
RECEIVING UNIT ED HANDOFF REVIEW    ED Nurse Handoff Report was reviewed by: Matilde Méndez RN on August 14, 2023 at 9:01 PM

## 2023-08-15 NOTE — PHARMACY-ADMISSION MEDICATION HISTORY
Pharmacist Admission Medication History    Admission medication history is complete. The information provided in this note is only as accurate as the sources available at the time of the update.    Medication reconciliation/reorder completed by provider prior to medication history? No    Information Source(s): Patient and CareEverywhere/SureScripts via in-person    Pertinent Information:   No Surescripts fill history seen. Med hx completed using patient report and [facility] CareEverywhere records  Patient reports he takes 2 tabs of trazodone at bedtime. Prescribed to be 150 mg QHS per [facility] Surescripts.   Patient reports he takes 2-4 caps of gabapentin at bedtime. He does not consistently take gabapentin during the day although it is prescribed as 2 capsules TID.   Patient reports he took 2 tabs of hydrochlorothiazide today because he was concerned about his blood pressure but he is supposed to take 1 tab daily. He states he is not very consistent about taking it.     Changes made to PTA medication list:  Added: hydroxyzine, trazodone, hydrochlorothiazide, advil  Deleted: lorazepam, sertraline, trazodone  Changed: gabapentin directions added    Medication Affordability:  Not including over the counter (OTC) medications, was there a time in the past 3 months when you did not take your medications as prescribed because of cost?: Unable to Assess    Allergies reviewed with patient and updates made in EHR: yes    Medication History Completed By: Alice Tuttle RPH 8/14/2023 9:00 PM    Prior to Admission medications    Medication Sig Last Dose Taking? Auth Provider Long Term End Date   albuterol (PROVENTIL HFA: VENTOLIN HFA) 108 (90 BASE) MCG/ACT inhaler Inhale 2 puffs into the lungs every 4 hours as needed for shortness of breath or wheezing More than a month Yes Reported, Patient     gabapentin (NEURONTIN) 300 MG capsule Take 600 mg by mouth 3 times daily 8/14/2023 at AM Yes Reported, Patient Yes     hydrochlorothiazide (HYDRODIURIL) 50 MG tablet Take 1 tablet by mouth daily 8/14/2023 at AM (2 doses) Yes Unknown, Entered By History Yes    hydrOXYzine (ATARAX) 25 MG tablet Take 25 mg by mouth nightly as needed for itching or anxiety Unknown at PRN Yes Unknown, Entered By History     ibuprofen (ADVIL/MOTRIN) 200 MG tablet Take 600 mg by mouth every 8 hours as needed for pain 8/14/2023 at AM Yes Unknown, Entered By History     traZODone (DESYREL) 150 MG tablet Take 300 mg by mouth At Bedtime   Reported, Patient Yes

## 2023-08-15 NOTE — H&P
[facility]    History and Physical - Hospitalist Service       Date of Admission:  8/14/2023    Assessment & Plan      Jorge Alberto Ernandez is a 58 year old male admitted on 8/14/2023.     Left lower extremity cellulitis  Sepsis due to above (tachycardia, fever, leukocytosis, hypertension despite use of antihypertensives)  - Ancef  - Monitor    Alcohol withdrawal  Alcohol use disorder, severe  *Typically drinks 6-12 16 ounce Michelob ultra's daily.  History of prior withdrawals with, never complicated by seizure.  - CIWA with diazepam  - Gabapentin without loading dose and clonidine for diazepam sparing  - Thiamine folate and multivitamin  - Chemical dependency counselor  - Patient has plan to maintain sobriety with AA which she will attend 5 days/week and reengage with his sponsor    Severe hypokalemia  - Replace per protocol  -Telemetry    Severe hypocalcemia  - Replace as needed    Hypomagnesemia  - Replace per protocol    Hypophosphatemia  - Replace per protocol    Hypertension  - Hold hydrochlorothiazide due to electrolyte abnormalities    Insomnia  - Continue PTA trazodone pending med rec       Diet:  Regular  DVT Prophylaxis: Pneumatic Compression Devices  Garcia Catheter: Not present  Lines: None     Cardiac Monitoring: None  Code Status:  Full code    Clinically Significant Risk Factors Present on Admission        # Hypokalemia: Lowest K = 2.4 mmol/L in last 2 days, will replace as needed     # Hypomagnesemia: Lowest Mg = 1.3 mg/dL in last 2 days, will replace as needed   # Hypoalbuminemia: Lowest albumin = 2.5 g/dL at 8/14/2023  7:00 PM, will monitor as appropriate                     Disposition Plan      Expected Discharge Date: 08/16/2023                  Charles Valenzuela MD  Hospitalist Service  [facility]  Securely message with [employer] (more info)  Text page via [employer] Paging/Directory      ______________________________________________________________________    Chief Complaint   Shaking    History is obtained from the patient, electronic health record, emergency department physician, and patient's daughter    History of Present Illness   Jorge Alberto Ernandez is a 58 year old male who has a history of alcohol use disorder, hypertension.  He presents to the emergency department due to shaking.  He had planned to stop drinking 8/14/2023.  He had a heavy weekend of drinking drinking at least 12 16 ounce cans of Michelob ultra each day.  He does not drink hard liquor.  He was doing well until around noon when he began shaking uncontrollably.  He had no falls.  He did find it difficult to ambulate because of his shaking.  He was having to crawl around on the floor because of his shaking.  He eventually opted to present to the emergency department for further management.    In the emergency department found to be in alcohol withdrawal.  Also found to have significant electrolyte abnormalities with severe hypokalemia, hypophosphatemia, hypomagnesemia, and hypocalcemia.      Past Medical History    Past Medical History:   Diagnosis Date    Unspecified asthma(493.90) age of 12    Asthma       Past Surgical History   Past Surgical History:   Procedure Laterality Date    SINUS SURGERY  2001       Prior to Admission Medications   Prior to Admission Medications   Prescriptions Last Dose Informant Patient Reported? Taking?   GABAPENTIN PO   Yes No   LORazepam (ATIVAN) 1 MG tablet   No No   Sig: Take 1 tablet (1 mg) by mouth every 6 hours as needed for anxiety (withdrawl)   SERTRALINE HCL PO   Yes No   Sig: Take by mouth daily   TRAZODONE HCL PO   Yes No   albuterol (PROVENTIL HFA: VENTOLIN HFA) 108 (90 BASE) MCG/ACT inhaler   Yes No   Sig: Inhale 2 puffs into the lungs every 6 hours.      Facility-Administered Medications: None           Physical Exam   Vital Signs: Temp: (!) 100.8  F (38.2  C) Temp src: Temporal BP:  (!) 144/95 Pulse: 114   Resp: 14 SpO2: 96 %      Weight: 265 lbs 0 oz    Constitutional: Awake, alert, cooperative, no apparent distress  Respiratory: Clear to auscultation bilaterally, no crackles or wheezing  Cardiovascular: Regular rate and rhythm, normal S1 and S2, and no murmur noted  GI: Normal bowel sounds, soft, non-distended, non-tender  Skin/Integumen: No rashes, no cyanosis, no edema  Other: Fine tremor of the hands and feet noted.  Tongue fasciculations.  Patient is alert and pleasant.      Medical Decision Making       80 MINUTES SPENT BY ME on the date of service doing chart review, history, exam, documentation & further activities per the note.      Data   ------------------------- PAST 24 HR DATA REVIEWED -----------------------------------------------    I have personally reviewed the following data over the past 24 hrs:    17.5 (H)  \   16.6   / 159     133 (L) 102 4.3 (L) /  70   2.4 (LL) 15 (L) 0.68 \     ALT: 35 AST: 80 (H) AP: 48 TBILI: 0.3   ALB: 2.5 (L) TOT PROTEIN: 4.3 (L) LIPASE: 9 (L)     Procal: 0.43 (H) CRP: N/A Lactic Acid: N/A         Imaging results reviewed over the past 24 hrs:   Recent Results (from the past 24 hour(s))   CT Head w/o Contrast    Narrative    EXAM: CT HEAD W/O CONTRAST  LOCATION: [facility]  DATE: 8/14/2023    INDICATION: Head injury, unable to walk  COMPARISON: None.  TECHNIQUE: Routine CT Head without IV contrast. Multiplanar reformats. Dose reduction techniques were used.    FINDINGS:  INTRACRANIAL CONTENTS: No intracranial hemorrhage, extraaxial collection, or mass effect.  No CT evidence of acute infarct. Mild presumed chronic small vessel ischemic changes. Mild generalized volume loss. No hydrocephalus.     VISUALIZED ORBITS/SINUSES/MASTOIDS: No intraorbital abnormality. No paranasal sinus mucosal disease. No middle ear or mastoid effusion.    BONES/SOFT TISSUES: No acute abnormality.      Impression    IMPRESSION:  1.  No acute  intracranial process.

## 2023-08-15 NOTE — UTILIZATION REVIEW
Admission Status; Secondary Review Determination       Under the authority of the Utilization Management Committee, the utilization review process indicated a secondary review on the above patient. The review outcome is based on review of the medical records, discussions with staff, and applying clinical experience noted on the date of the review.     (x) Inpatient Status Appropriate - This patient's medical care is consistent with medical management for inpatient care and reasonable inpatient medical practice.     RATIONALE FOR DETERMINATION   58-year-old male was admitted on 8/14/2023 due to left lower extremity cellulitis leading to sepsis, marked by tachycardia, fever, leukocytosis, and hypertension despite antihypertensives. He was treated with Ancef . Additionally, the patient has a severe alcohol use disorder, consuming 6-12 16-ounce Michelob Ultras daily, with a history of withdrawals but no seizures. He is on a CIWA protocol with diazepam, gabapentin, clonidine, thiamine, folate, and multivitamin. Chemical dependency counseling is in place, and he plans to attend AA five days a week. Notably, he has severe hypokalemia and hypocalcemia, necessitating replacements and telemetry monitoring.  Given the patient's sepsis from cellulitis, coupled with alcohol withdrawal and severe electrolyte imbalances, there's a high risk for multi-organ complications and withdrawal-related seizures. Treatment requires aggressive fluid resuscitation, electrolyte management, and close monitoring for sepsis resolution and withdrawal symptoms. Outpatient or observation care would likely lead to rapid clinical deterioration, increasing the risk for severe adverse outcomes.        This document was produced using voice recognition software       The information on this document is developed by the utilization review team in order for the business office to ensure compliance. This only denotes the appropriateness of proper  admission status and does not reflect the quality of care rendered.   The definitions of Inpatient Status and Observation Status used in making the determination above are those provided in the CMS Coverage Manual, Chapter 1 and Chapter 6, section 70.4.   Sincerely,   HOMAR AYON MD   System Medical Director   Utilization [facility].

## 2023-08-15 NOTE — PLAN OF CARE
Neuro: A&O x4, scored 1-2 on CIWA  Tele/cardiac: SR/ST  Respiration: on RA  Activity: SBA  Pain: denies  Drips: PIV SL  Drains/tubes: none  Skin: blanchable redness LLE  GI/: continent, regular diet, good UOP  Aggression color: green  Isolation: none

## 2023-08-15 NOTE — PROGRESS NOTES
[facility]    Medicine Progress Note - Hospitalist Service    Date of Admission:  8/14/2023    Assessment & Plan   Jorge Alberto Ernandez is a 58 year old male admitted on 8/14/2023.      Left lower extremity cellulitis  Sepsis due to above (tachycardia, fever, leukocytosis, hypertension despite use of antihypertensives)  - Ancef  - Monitor  - did report a small wound between first two toes but states it has been improving for a long time, monitor, could consider WOC RN consult     Alcohol withdrawal  Alcohol use disorder, severe  *Typically drinks 6-12 16 ounce Michelob ultra's daily.  History of prior withdrawals with, never complicated by seizure.  - CIWA with diazepam  - Gabapentin without loading dose and clonidine for diazepam sparing  - Thiamine folate and multivitamin  - Chemical dependency counselor  - Patient has plan to maintain sobriety with AA which she will attend 5 days/week and reengage with his sponsor  - tremor improving, feels like he is getting better     Severe hypokalemia - improving  - Replace per protocol  - Telemetry     Severe hypocalcemia  - Replace as needed     Hypomagnesemia  - Replace per protocol - improving but will need days of repletion to truly normalize     Hypophosphatemia  - Replace per protocol     Hypertension  - Hold hydrochlorothiazide due to electrolyte abnormalities    Insomnia  - Continue PTA trazodone pending med rec          Diet: Combination Diet Regular Diet Adult    DVT Prophylaxis: Pneumatic Compression Devices  Garcia Catheter: Not present  Lines: None     Cardiac Monitoring: ACTIVE order. Indication: Electrolyte Imbalance (24 hours)- Magnesium <1.3 mg/ml; Potassium < =2.8 or > 5.5 mg/ml  Code Status: Full Code      Clinically Significant Risk Factors Present on Admission        # Hypokalemia: Lowest K = 2.4 mmol/L in last 2 days, will replace as needed   # Hypocalcemia: Lowest iCa = 4.3 mg/dL in last 2 days, will monitor and replace as appropriate    # Hypomagnesemia: Lowest Mg = 1.3 mg/dL in last 2 days, will replace as needed   # Hypoalbuminemia: Lowest albumin = 2.5 g/dL at 8/14/2023  7:00 PM, will monitor as appropriate          # Obesity: Estimated body mass index is 35.73 kg/m  as calculated from the following:    Height as of this encounter: 1.829 m (6').    Weight as of this encounter: 119.5 kg (263 lb 7.2 oz).              Disposition Plan      Expected Discharge Date: 08/16/2023                  Asher Patel MD  Hospitalist Service  [facility]  Securely message with [employer] (more info)  Text page via [facility] Paging/Directory   ______________________________________________________________________    Interval History   Care assumed today. Seen and examined. Feeling better overall today. No new pain, fevers or chills.    Physical Exam   Vital Signs: Temp: 97.5  F (36.4  C) Temp src: Oral BP: 134/84 Pulse: 70   Resp: 16 SpO2: 97 % O2 Device: None (Room air)    Weight: 263 lbs 7.2 oz    Gen: NAD, pleasant  HEENT: EOMI, MMM  Resp: no focal crackles, no wheezes, no increased work of resp  CV: S1S2 heard, reg rhythm, reg rate  Abdo: soft, nontender, nondistended, bowel sounds present  Ext: Left leg below knee to ankle erythematous, warm, no overt skin defect  Neuro: aa, conversant, moving ext, CN grossly intact, no facial asymmetry      Medical Decision Making       41 MINUTES SPENT BY ME on the date of service doing chart review, history, exam, documentation & further activities per the note.      Data     I have personally reviewed the following data over the past 24 hrs:    10.1  \   16.7   / 142 (L)     132 (L) 93 (L) 6.1 /  96   3.5 27 0.92 \     ALT: 35 AST: 80 (H) AP: 48 TBILI: 0.3   ALB: 2.5 (L) TOT PROTEIN: 4.3 (L) LIPASE: 9 (L)     Procal: 0.43 (H) CRP: N/A Lactic Acid: 1.6

## 2023-08-15 NOTE — PLAN OF CARE
Goal Outcome Evaluation:    Orientations: AOx4, scored 1-2 on CIWAs  Vitals/Pain: VSS, RA, pain whem LLE is touched, refused pain medication   Tele: SR  Lines/Drains: PIV SL   Skin/Wounds: Blanchable redness LLE, scabbing between L big toes  GI/: WNL  Labs: Abnormal/Trends, Electrolyte Replacement- Rechecks in the morning   Ambulation/Assist: Ind   Plan: Continue plan of care

## 2023-08-16 ENCOUNTER — APPOINTMENT (OUTPATIENT)
Dept: ULTRASOUND IMAGING | Facility: CLINIC | Age: 58
DRG: 872 | End: 2023-08-16
Attending: INTERNAL MEDICINE
Payer: COMMERCIAL

## 2023-08-16 LAB
ANION GAP SERPL CALCULATED.3IONS-SCNC: 11 MMOL/L (ref 7–15)
BASOPHILS # BLD AUTO: 0.1 10E3/UL (ref 0–0.2)
BASOPHILS NFR BLD AUTO: 1 %
BUN SERPL-MCNC: 7 MG/DL (ref 6–20)
CALCIUM SERPL-MCNC: 8.7 MG/DL (ref 8.6–10)
CHLORIDE SERPL-SCNC: 97 MMOL/L (ref 98–107)
CREAT SERPL-MCNC: 0.88 MG/DL (ref 0.67–1.17)
DEPRECATED HCO3 PLAS-SCNC: 24 MMOL/L (ref 22–29)
EOSINOPHIL # BLD AUTO: 0.1 10E3/UL (ref 0–0.7)
EOSINOPHIL NFR BLD AUTO: 2 %
ERYTHROCYTE [DISTWIDTH] IN BLOOD BY AUTOMATED COUNT: 12.8 % (ref 10–15)
GFR SERPL CREATININE-BSD FRML MDRD: >90 ML/MIN/1.73M2
GLUCOSE BLDC GLUCOMTR-MCNC: 106 MG/DL (ref 70–99)
GLUCOSE BLDC GLUCOMTR-MCNC: 106 MG/DL (ref 70–99)
GLUCOSE SERPL-MCNC: 96 MG/DL (ref 70–99)
HCT VFR BLD AUTO: 44.1 % (ref 40–53)
HGB BLD-MCNC: 15.5 G/DL (ref 13.3–17.7)
IMM GRANULOCYTES # BLD: 0 10E3/UL
IMM GRANULOCYTES NFR BLD: 0 %
LYMPHOCYTES # BLD AUTO: 1.2 10E3/UL (ref 0.8–5.3)
LYMPHOCYTES NFR BLD AUTO: 17 %
MAGNESIUM SERPL-MCNC: 2 MG/DL (ref 1.7–2.3)
MCH RBC QN AUTO: 32 PG (ref 26.5–33)
MCHC RBC AUTO-ENTMCNC: 35.1 G/DL (ref 31.5–36.5)
MCV RBC AUTO: 91 FL (ref 78–100)
MONOCYTES # BLD AUTO: 0.7 10E3/UL (ref 0–1.3)
MONOCYTES NFR BLD AUTO: 10 %
NEUTROPHILS # BLD AUTO: 4.8 10E3/UL (ref 1.6–8.3)
NEUTROPHILS NFR BLD AUTO: 70 %
NRBC # BLD AUTO: 0 10E3/UL
NRBC BLD AUTO-RTO: 0 /100
PHOSPHATE SERPL-MCNC: 3.2 MG/DL (ref 2.5–4.5)
PLATELET # BLD AUTO: 142 10E3/UL (ref 150–450)
POTASSIUM SERPL-SCNC: 3.5 MMOL/L (ref 3.4–5.3)
RBC # BLD AUTO: 4.85 10E6/UL (ref 4.4–5.9)
SODIUM SERPL-SCNC: 132 MMOL/L (ref 136–145)
WBC # BLD AUTO: 6.8 10E3/UL (ref 4–11)

## 2023-08-16 PROCEDURE — 99233 SBSQ HOSP IP/OBS HIGH 50: CPT | Performed by: INTERNAL MEDICINE

## 2023-08-16 PROCEDURE — 84100 ASSAY OF PHOSPHORUS: CPT | Performed by: INTERNAL MEDICINE

## 2023-08-16 PROCEDURE — 93971 EXTREMITY STUDY: CPT | Mod: LT

## 2023-08-16 PROCEDURE — 85025 COMPLETE CBC W/AUTO DIFF WBC: CPT | Performed by: HOSPITALIST

## 2023-08-16 PROCEDURE — 250N000011 HC RX IP 250 OP 636: Performed by: STUDENT IN AN ORGANIZED HEALTH CARE EDUCATION/TRAINING PROGRAM

## 2023-08-16 PROCEDURE — 36415 COLL VENOUS BLD VENIPUNCTURE: CPT | Performed by: HOSPITALIST

## 2023-08-16 PROCEDURE — 83735 ASSAY OF MAGNESIUM: CPT | Performed by: STUDENT IN AN ORGANIZED HEALTH CARE EDUCATION/TRAINING PROGRAM

## 2023-08-16 PROCEDURE — 120N000001 HC R&B MED SURG/OB

## 2023-08-16 PROCEDURE — 250N000013 HC RX MED GY IP 250 OP 250 PS 637: Performed by: INTERNAL MEDICINE

## 2023-08-16 PROCEDURE — 250N000013 HC RX MED GY IP 250 OP 250 PS 637: Performed by: STUDENT IN AN ORGANIZED HEALTH CARE EDUCATION/TRAINING PROGRAM

## 2023-08-16 PROCEDURE — 80048 BASIC METABOLIC PNL TOTAL CA: CPT | Performed by: HOSPITALIST

## 2023-08-16 PROCEDURE — 250N000011 HC RX IP 250 OP 636: Mod: JZ | Performed by: INTERNAL MEDICINE

## 2023-08-16 RX ORDER — GABAPENTIN 300 MG/1
600 CAPSULE ORAL EVERY 8 HOURS
Status: DISCONTINUED | OUTPATIENT
Start: 2023-08-18 | End: 2023-08-17 | Stop reason: HOSPADM

## 2023-08-16 RX ORDER — TRAZODONE HYDROCHLORIDE 100 MG/1
100 TABLET ORAL
Status: DISCONTINUED | OUTPATIENT
Start: 2023-08-16 | End: 2023-08-17 | Stop reason: HOSPADM

## 2023-08-16 RX ORDER — HYDROXYZINE HYDROCHLORIDE 25 MG/1
25 TABLET, FILM COATED ORAL
Status: DISCONTINUED | OUTPATIENT
Start: 2023-08-16 | End: 2023-08-17 | Stop reason: HOSPADM

## 2023-08-16 RX ORDER — CEFAZOLIN SODIUM 2 G/100ML
2 INJECTION, SOLUTION INTRAVENOUS EVERY 8 HOURS
Status: DISCONTINUED | OUTPATIENT
Start: 2023-08-16 | End: 2023-08-17

## 2023-08-16 RX ORDER — ENOXAPARIN SODIUM 100 MG/ML
40 INJECTION SUBCUTANEOUS EVERY 24 HOURS
Status: DISCONTINUED | OUTPATIENT
Start: 2023-08-16 | End: 2023-08-17 | Stop reason: HOSPADM

## 2023-08-16 RX ORDER — METHOCARBAMOL 500 MG/1
500 TABLET, FILM COATED ORAL 3 TIMES DAILY PRN
Status: DISCONTINUED | OUTPATIENT
Start: 2023-08-16 | End: 2023-08-17 | Stop reason: HOSPADM

## 2023-08-16 RX ADMIN — ENOXAPARIN SODIUM 40 MG: 40 INJECTION SUBCUTANEOUS at 12:07

## 2023-08-16 RX ADMIN — MULTIPLE VITAMINS W/ MINERALS TAB 1 TABLET: TAB at 09:26

## 2023-08-16 RX ADMIN — GABAPENTIN 900 MG: 300 CAPSULE ORAL at 05:12

## 2023-08-16 RX ADMIN — FOLIC ACID 1 MG: 1 TABLET ORAL at 09:26

## 2023-08-16 RX ADMIN — GABAPENTIN 900 MG: 300 CAPSULE ORAL at 13:55

## 2023-08-16 RX ADMIN — ACETAMINOPHEN 650 MG: 325 TABLET, FILM COATED ORAL at 13:59

## 2023-08-16 RX ADMIN — THIAMINE HCL TAB 100 MG 100 MG: 100 TAB at 09:26

## 2023-08-16 RX ADMIN — CEFAZOLIN SODIUM 2 G: 2 INJECTION, SOLUTION INTRAVENOUS at 20:52

## 2023-08-16 RX ADMIN — GABAPENTIN 900 MG: 300 CAPSULE ORAL at 20:49

## 2023-08-16 RX ADMIN — CEFAZOLIN 1 G: 1 INJECTION, POWDER, FOR SOLUTION INTRAMUSCULAR; INTRAVENOUS at 05:11

## 2023-08-16 RX ADMIN — CLONIDINE HYDROCHLORIDE 0.1 MG: 0.1 TABLET ORAL at 20:48

## 2023-08-16 RX ADMIN — METHOCARBAMOL 500 MG: 500 TABLET ORAL at 15:54

## 2023-08-16 RX ADMIN — CLONIDINE HYDROCHLORIDE 0.1 MG: 0.1 TABLET ORAL at 05:12

## 2023-08-16 RX ADMIN — CLONIDINE HYDROCHLORIDE 0.1 MG: 0.1 TABLET ORAL at 13:55

## 2023-08-16 RX ADMIN — CEFAZOLIN SODIUM 2 G: 2 INJECTION, SOLUTION INTRAVENOUS at 12:07

## 2023-08-16 ASSESSMENT — ACTIVITIES OF DAILY LIVING (ADL)
ADLS_ACUITY_SCORE: 37

## 2023-08-16 NOTE — PLAN OF CARE
Orientation: A&Ox4.  Vitals/Pain: VSS on RA. Gave PRN tylenol for HA & PRN robaxin for muscle pain. CIWA: 0, 1, 0.  Tele: SR  Diet: Regular  Lungs: Clear LS.  Lines/Drains: PIV x2 SL, int abx.  Skin/Wounds: L shin reddened, warm, & tender; outlined; elevated on pillows.   GI/: AUO. +BM.   Labs: K, mg, phos lab rechecks in AM.   Ambulation/Assist: Up independent.   Plan: LLE US completed. Continue to monitor.

## 2023-08-16 NOTE — PLAN OF CARE
Neuro: A&O x4, scored 1 on CIWA  Tele/cardiac: SR  Respiration: on RA  Activity: SBA  Pain: denies  Drips: PIV SL  Drains/tubes: none  Skin: blanchable redness LLE  GI/: continent, regular diet, good UOP  Aggression color: green  Isolation: none

## 2023-08-16 NOTE — PROGRESS NOTES
[facility]    Medicine Progress Note - Hospitalist Service    Date of Admission:  8/14/2023    Assessment & Plan   Jorge Alberto Ernandez is a 58 year old male admitted on 8/14/2023.      Left lower extremity cellulitis  Sepsis due to above (tachycardia, fever, leukocytosis, hypertension despite use of antihypertensives)  - erythema still present in mid shin, marking made on 8/16, patient does not feel it is better and not worsening either, but reports more pain with ambulation and touch  - continue with ancef  - LE US ordered  - elevated LLE as much as possible  - monitor, he will benefit from continued IV antibiotics at this time  - wbc and tachycardia have improved  - Monitor     Alcohol withdrawal- improved  Alcohol use disorder, severe  *Typically drinks 6-12 16 ounce Michelob ultra's daily.  History of prior withdrawals with, never complicated by seizure or DTs  - CIWA with diazepam, scores have been low. Last drink was 8/14. If CIWA remains low in the next 24 hrs, likely discontinue CIWA  - will continue with gabapentin taper until he gets to 600mg TID which is his PTA dose  - Thiamine folate and multivitamin  - Chemical dependency counselor offered, patient declines at this time. He plans to re-establish with his sponsor and attend AA meeting regularly at this time  - overall improving    Severe hypokalemia - improving  - Replace per protocol as needed    Severe hypocalcemia- resolved.      Hypomagnesemia  - Replace per protocol as needed- now resolved     Hypophosphatemia  - replace per protocol as needed. Now resolved.     Hypertension  - Hold hydrochlorothiazide due to electrolyte abnormalities, likely not a great option with alcohol use and presentation with electrolyte derrangement  - consider amlodipine vs other at time of discharge. BP is currently controlled     Insomnia  - Continue PTA trazodone prn       Diet: Combination Diet Regular Diet Adult    DVT Prophylaxis: Enoxaparin  (Lovenox) SQ  Garcia Catheter: Not present  Lines: None     Cardiac Monitoring: ACTIVE order. Indication: Electrolyte Imbalance (24 hours)- Magnesium <1.3 mg/ml; Potassium < =2.8 or > 5.5 mg/ml  Code Status: Full Code      Clinically Significant Risk Factors        # Hypokalemia: Lowest K = 2.4 mmol/L in last 2 days, will replace as needed   # Hypocalcemia: Lowest iCa = 4.3 mg/dL in last 2 days, will monitor and replace as appropriate   # Hypomagnesemia: Lowest Mg = 1.3 mg/dL in last 2 days, will replace as needed   # Hypoalbuminemia: Lowest albumin = 2.5 g/dL at 8/14/2023  7:00 PM, will monitor as appropriate            # Obesity: Estimated body mass index is 35.73 kg/m  as calculated from the following:    Height as of this encounter: 1.829 m (6').    Weight as of this encounter: 119.5 kg (263 lb 7.2 oz)., PRESENT ON ADMISSION            Disposition Plan  anticipate discharge in the next 2 days pending improvement in cellulitis     Expected Discharge Date: 08/16/2023,  3:00 PM                Sylvia Hall MD  Hospitalist Service  [facility]  Securely message with [facility] (more info)  Text page via [email or web address] Paging/Directory   ______________________________________________________________________    Interval History   Patient reports he no longer has tremors and feels improved.   States left leg erythema is not any better. Endorses pain when he walks on it. Feels more tender when touched.     Physical Exam   Vital Signs: Temp: 98.1  F (36.7  C) Temp src: Oral BP: 111/67 Pulse: 86   Resp: 16 SpO2: 95 % O2 Device: None (Room air)    Weight: 263 lbs 7.2 oz    General Appearance: Alert, awake and no apparent distress  Respiratory: CTAB, no wheezing  Cardiovascular: regular rate and rhythm  GI: soft and non-tender  Skin: Left leg shin with erythema, edema and warmth as well as tenderness, marking done   Other: No tremors     Medical Decision Making       45 MINUTES SPENT BY ME on the date of  service doing chart review, history, exam, documentation & further activities per the note.  MANAGEMENT DISCUSSED with the following over the past 24 hours: patient and RN   NOTE(S)/MEDICAL RECORDS REVIEWED over the past 24 hours: nursing notes  Tests ORDERED & REVIEWED in the past 24 hours:  - BMP  - CBC  - Magnesium  - Phosphorus      Data     I have personally reviewed the following data over the past 24 hrs:    6.8  \   15.5   / 142 (L)     132 (L) 97 (L) 7.0 /  106 (H)   3.5 24 0.88 \     Procal: N/A CRP: N/A Lactic Acid: 1.1

## 2023-08-17 VITALS
DIASTOLIC BLOOD PRESSURE: 86 MMHG | OXYGEN SATURATION: 96 % | WEIGHT: 263.45 LBS | BODY MASS INDEX: 35.68 KG/M2 | TEMPERATURE: 97.9 F | HEIGHT: 72 IN | HEART RATE: 72 BPM | RESPIRATION RATE: 16 BRPM | SYSTOLIC BLOOD PRESSURE: 136 MMHG

## 2023-08-17 LAB
ANION GAP SERPL CALCULATED.3IONS-SCNC: 11 MMOL/L (ref 7–15)
BUN SERPL-MCNC: 7.7 MG/DL (ref 6–20)
CALCIUM SERPL-MCNC: 8.9 MG/DL (ref 8.6–10)
CHLORIDE SERPL-SCNC: 97 MMOL/L (ref 98–107)
CREAT SERPL-MCNC: 0.83 MG/DL (ref 0.67–1.17)
DEPRECATED HCO3 PLAS-SCNC: 24 MMOL/L (ref 22–29)
GFR SERPL CREATININE-BSD FRML MDRD: >90 ML/MIN/1.73M2
GLUCOSE SERPL-MCNC: 97 MG/DL (ref 70–99)
MAGNESIUM SERPL-MCNC: 2 MG/DL (ref 1.7–2.3)
PHOSPHATE SERPL-MCNC: 3.8 MG/DL (ref 2.5–4.5)
POTASSIUM SERPL-SCNC: 3.6 MMOL/L (ref 3.4–5.3)
SODIUM SERPL-SCNC: 132 MMOL/L (ref 136–145)

## 2023-08-17 PROCEDURE — 250N000011 HC RX IP 250 OP 636: Mod: JZ | Performed by: STUDENT IN AN ORGANIZED HEALTH CARE EDUCATION/TRAINING PROGRAM

## 2023-08-17 PROCEDURE — 250N000013 HC RX MED GY IP 250 OP 250 PS 637: Performed by: HOSPITALIST

## 2023-08-17 PROCEDURE — 36415 COLL VENOUS BLD VENIPUNCTURE: CPT | Performed by: INTERNAL MEDICINE

## 2023-08-17 PROCEDURE — 84100 ASSAY OF PHOSPHORUS: CPT | Performed by: STUDENT IN AN ORGANIZED HEALTH CARE EDUCATION/TRAINING PROGRAM

## 2023-08-17 PROCEDURE — 82310 ASSAY OF CALCIUM: CPT | Performed by: INTERNAL MEDICINE

## 2023-08-17 PROCEDURE — 250N000013 HC RX MED GY IP 250 OP 250 PS 637: Performed by: STUDENT IN AN ORGANIZED HEALTH CARE EDUCATION/TRAINING PROGRAM

## 2023-08-17 PROCEDURE — 83735 ASSAY OF MAGNESIUM: CPT | Performed by: STUDENT IN AN ORGANIZED HEALTH CARE EDUCATION/TRAINING PROGRAM

## 2023-08-17 PROCEDURE — 99239 HOSP IP/OBS DSCHRG MGMT >30: CPT | Performed by: HOSPITALIST

## 2023-08-17 RX ORDER — LANOLIN ALCOHOL/MO/W.PET/CERES
100 CREAM (GRAM) TOPICAL DAILY
Qty: 14 TABLET | Refills: 0 | Status: SHIPPED | OUTPATIENT
Start: 2023-08-17

## 2023-08-17 RX ORDER — CEPHALEXIN 500 MG/1
500 CAPSULE ORAL 4 TIMES DAILY
Qty: 28 CAPSULE | Refills: 0 | Status: SHIPPED | OUTPATIENT
Start: 2023-08-17 | End: 2023-08-24

## 2023-08-17 RX ORDER — MULTIPLE VITAMINS W/ MINERALS TAB 9MG-400MCG
1 TAB ORAL DAILY
COMMUNITY
Start: 2023-08-17

## 2023-08-17 RX ORDER — ACETAMINOPHEN 325 MG/1
650 TABLET ORAL EVERY 6 HOURS PRN
COMMUNITY
Start: 2023-08-17

## 2023-08-17 RX ORDER — CEPHALEXIN 500 MG/1
500 CAPSULE ORAL 4 TIMES DAILY
Status: DISCONTINUED | OUTPATIENT
Start: 2023-08-17 | End: 2023-08-17 | Stop reason: HOSPADM

## 2023-08-17 RX ADMIN — GABAPENTIN 900 MG: 300 CAPSULE ORAL at 05:41

## 2023-08-17 RX ADMIN — MULTIPLE VITAMINS W/ MINERALS TAB 1 TABLET: TAB at 09:25

## 2023-08-17 RX ADMIN — THIAMINE HCL TAB 100 MG 100 MG: 100 TAB at 09:25

## 2023-08-17 RX ADMIN — CEPHALEXIN 500 MG: 500 CAPSULE ORAL at 09:25

## 2023-08-17 RX ADMIN — CEFAZOLIN SODIUM 2 G: 2 INJECTION, SOLUTION INTRAVENOUS at 05:26

## 2023-08-17 RX ADMIN — ACETAMINOPHEN 650 MG: 325 TABLET, FILM COATED ORAL at 05:40

## 2023-08-17 RX ADMIN — FOLIC ACID 1 MG: 1 TABLET ORAL at 09:25

## 2023-08-17 RX ADMIN — CLONIDINE HYDROCHLORIDE 0.1 MG: 0.1 TABLET ORAL at 05:55

## 2023-08-17 ASSESSMENT — ACTIVITIES OF DAILY LIVING (ADL)
ADLS_ACUITY_SCORE: 37

## 2023-08-17 NOTE — DISCHARGE SUMMARY
[facility]  Hospitalist Discharge Summary      Date of Admission:  8/14/2023  Date of Discharge:  8/17/2023  Discharging Provider: Darwin Trinidad MD  Discharge Service: Hospitalist Service    Discharge Diagnoses   Sepsis, left lower extremity cellulitis  Alcohol use disorder  Acute alcohol withdrawal  Hypokalemia  Hypocalcemia  Hypomagnesemia  Hypophosphatemia  Essential hypertension  Insomnia    Clinically Significant Risk Factors     # Obesity: Estimated body mass index is 35.73 kg/m  as calculated from the following:    Height as of this encounter: 1.829 m (6').    Weight as of this encounter: 119.5 kg (263 lb 7.2 oz).       Follow-ups Needed After Discharge   Follow-up Appointments     Follow-up and recommended labs and tests       Follow up with primary care provider, Leonel Mendoza MD, within 7   days for hospital follow-up of left leg cellulitis, alcohol abstinence,   labs, medication review.  CBC, basic, serum magnesium and phosphorus blood   work, ordered and reviewed by outpatient primary clinic provider at   follow-up visit.            Unresulted Labs Ordered in the Past 30 Days of this Admission       Date and Time Order Name Status Description    8/14/2023  7:19 PM Blood Culture Peripheral Blood Preliminary     8/14/2023  7:19 PM Blood Culture Peripheral Blood Preliminary         These results will be followed up by primary clinic provider, review at upcoming visit    Discharge Disposition   Discharged to home  Condition at discharge: Stable    Hospital Course   Jorge Alberto Ernandez is a 58 year old male with past medical history of hypertension, alcohol use disorder, insomnia, and asthma, admitted with fever, tachycardia, leukocytosis and concerns of sepsis with left lower extremity cellulitis.  For details, see admission note.    Left lower extremity cellulitis  Sepsis due to above (tachycardia, fever, leukocytosis on admission)  Started on IV antibiotics, cefazolin  (Ancef).  *Left lower extremity ultrasound (-) for DVT 8/16.  - transition to oral Keflex on discharge 8/17 for 7 days; close follow-up with primary clinic provider discussed and recommended     Acute alcohol withdrawal  Alcohol use disorder, severe  *Typically drinks 6-12 16 ounce Michelob ultra's daily.  History of prior withdrawals, never complicated by seizure or delirium tremens by report.  - CIWA protocol in hospital with diazepam as needed; scores low, last drink was 8/14.  - symptoms resolving by time of discharge; prior to admission gabapentin resumed  - continued thiamine, multivitamin on discharge  - alcohol abstinence discussed and encouraged along with ongoing treatment, follow-up with primary clinic provider arranged  - Chemical dependency counselor was offered, patient declined at this time.  Plans to re-establish with his sponsor and attend AA meeting regularly at this time     Severe hypokalemia, resolved  - Replaced per protocol as needed     Severe hypocalcemia, resolved.      Hypomagnesemia, resolved  - Replaced per protocol as needed     Hypophosphatemia, resolved  - Replaced per protocol as needed.     Hypertension  - Initially held hydrochlorothiazide due to electrolyte abnormalities on admission, resumed on discharge with close outpatient follow-up and recheck labs  - if recurrent electrolyte abnormalities at follow-up visit in the absence of alcohol intake, would consider alternative blood pressure medication; primary clinic provider to review at upcoming visit     Insomnia  - Continued PTA trazodone prn    Patient instructed to call or seek medical attention if any worrisome signs or symptoms develop after discharge.  Insisted on hospital discharge 8/17 on initial visit despite encouragement to consider additional day of hospitalization for IV antibiotics and monitoring.  Patient agrees with plan as outlined and will follow up as recommended and outlined in the Dismissal  Summary.    Consultations This Hospital Stay   None    Code Status   Full Code    Time Spent on this Encounter   I, Darwin Trinidad MD, personally saw the patient today and spent greater than 30 minutes discharging this patient.       Darwin Trinidad MD  [facility]  [address] [place] [address]  Phone: [phone]  Fax: [phone]  ______________________________________________________________________    Physical Exam   Vital Signs: Temp: 97.9  F (36.6  C) Temp src: Oral BP: 136/86 Pulse: 72   Resp: 16 SpO2: 96 % O2 Device: None (Room air)    Weight: 263 lbs 7.2 oz    GENERAL awake and alert, sitting up in bed, no acute distress, requesting to discharge home  LUNGS no wheezes or crackles  HEART S1, S2 with RRR  ABDOMEN soft, nontender  MUSCULOSKELETAL extremities without edema  SKIN warm and dry; erythema left lower extremity improving from prior marked border, below knee and above ankle, mid shin; warm to touch; no ulcer or drainage  NEURO moves upper and lower extremities spontaneously and to command  MENTAL STATUS answering questions and following simple commands       Primary Care Physician   Leonel Mendoza MD    Discharge Orders      Reason for your hospital stay    Left leg cellulitis, alcohol use disorder with electrolytes abnormalities.     Follow-up and recommended labs and tests     Follow up with primary care provider, Leonel Mendoza MD, within 7 days for hospital follow-up of left leg cellulitis, alcohol abstinence, labs, medication review.  CBC, basic, serum magnesium and phosphorus blood work, ordered and reviewed by outpatient primary clinic provider at follow-up visit.     Activity    Your activity upon discharge: activity as tolerated     Diet    Follow this diet upon discharge: Orders Placed This Encounter      Combination Diet Regular Diet Adult    Avoid alcohol as discussed       Significant Results and Procedures   Most Recent  3 CBC's:  Recent Labs   Lab Test 08/16/23  0532 08/15/23  0622 08/14/23  1900   WBC 6.8 10.1 17.5*   HGB 15.5 16.7 16.6   MCV 91 90 89   * 142* 159     Most Recent 3 BMP's:  Recent Labs   Lab Test 08/17/23  0656 08/16/23  1132 08/16/23  0802 08/16/23  0532 08/15/23  0622   *  --   --  132* 132*   POTASSIUM 3.6  --   --  3.5 3.5   CHLORIDE 97*  --   --  97* 93*   CO2 24  --   --  24 27   BUN 7.7  --   --  7.0 6.1   CR 0.83  --   --  0.88 0.92   ANIONGAP 11  --   --  11 12   GEORGE 8.9  --   --  8.7 8.4*   GLC 97 106* 106* 96 96   ,   Results for orders placed or performed during the hospital encounter of 08/14/23   CT Head w/o Contrast    Narrative    EXAM: CT HEAD W/O CONTRAST  LOCATION: [facility]  DATE: 8/14/2023    INDICATION: Head injury, unable to walk  COMPARISON: None.  TECHNIQUE: Routine CT Head without IV contrast. Multiplanar reformats. Dose reduction techniques were used.    FINDINGS:  INTRACRANIAL CONTENTS: No intracranial hemorrhage, extraaxial collection, or mass effect.  No CT evidence of acute infarct. Mild presumed chronic small vessel ischemic changes. Mild generalized volume loss. No hydrocephalus.     VISUALIZED ORBITS/SINUSES/MASTOIDS: No intraorbital abnormality. No paranasal sinus mucosal disease. No middle ear or mastoid effusion.    BONES/SOFT TISSUES: No acute abnormality.      Impression    IMPRESSION:  1.  No acute intracranial process.   XR Chest 2 Views    Narrative    EXAM: XR CHEST 2 VIEWS  LOCATION: [facility]  DATE: 8/14/2023    INDICATION: etoh, weakness, low grade temp  COMPARISON: 02/15/2018      Impression    IMPRESSION: Heart size and pulmonary vascularity normal. The lungs are clear. Hypertrophic changes thoracic spine. Gas distended bowel loops upper abdomen.   US Lower Extremity Venous Duplex Left    Narrative    VENOUS ULTRASOUND LEFT LEG  8/16/2023 11:13 AM     HISTORY: evaluate for DVT, left calf  redness.    COMPARISON: None.    FINDINGS:  Examination of the deep veins with graded compression and  color flow Doppler with spectral wave form analysis was performed.   There is no evidence for DVT in the left lower extremity.      Impression    IMPRESSION: No evidence of deep venous thrombosis.    MADHAVI COLEMAN MD         SYSTEM ID:  [id number]       Discharge Medications   Current Discharge Medication List        START taking these medications    Details   acetaminophen (TYLENOL) 325 MG tablet Take 2 tablets (650 mg) by mouth every 6 hours as needed for mild pain or other (and adjunct with moderate or severe pain or per patient request)    Associated Diagnoses: Pain      cephALEXin (KEFLEX) 500 MG capsule Take 1 capsule (500 mg) by mouth 4 times daily for 7 days  Qty: 28 capsule, Refills: 0    Associated Diagnoses: Cellulitis of left lower extremity      multivitamin w/minerals (THERA-VIT-M) tablet Take 1 tablet by mouth daily    Associated Diagnoses: Alcohol withdrawal syndrome with complication (H)      thiamine (B-1) 100 MG tablet Take 1 tablet (100 mg) by mouth daily  Qty: 14 tablet, Refills: 0    Associated Diagnoses: Alcohol withdrawal syndrome with complication (H)           CONTINUE these medications which have NOT CHANGED    Details   albuterol (PROVENTIL HFA: VENTOLIN HFA) 108 (90 BASE) MCG/ACT inhaler Inhale 2 puffs into the lungs every 4 hours as needed for shortness of breath or wheezing      gabapentin (NEURONTIN) 300 MG capsule Take 600 mg by mouth 3 times daily      hydrochlorothiazide (HYDRODIURIL) 50 MG tablet Take 1 tablet by mouth daily      hydrOXYzine (ATARAX) 25 MG tablet Take 25 mg by mouth nightly as needed for itching or anxiety      traZODone (DESYREL) 150 MG tablet Take 300 mg by mouth At Bedtime           STOP taking these medications       ibuprofen (ADVIL/MOTRIN) 200 MG tablet Comments:   Reason for Stopping:             Allergies   No Known Allergies

## 2023-08-17 NOTE — PROGRESS NOTES
Pt A&Ox4, VSS on RA, tolerates well regular diet, up independently. Denies SOB, pain managed with current regimen, discharge awaiting.

## 2023-08-17 NOTE — PLAN OF CARE
[id number]    A&O x 4. VSS on RA. Up ind in room. Regular diet, tolerating well. 2xPIV in BUE, SL. Lungs clear. LLE shin red/warm/tender, outlined, no change. Continent of b/b, toileting independently. Continue plan of care.

## 2023-08-17 NOTE — PLAN OF CARE
Goal Outcome Evaluation:    Patient A&Ox4. Up ind in room. Vitals stable on RA. CIWA's 1 or 2, from dull headache. No other signs or symptoms of withdrawal. Writer had lengthy conversation at the bedside this morning regarding plans for abstinence from alcohol after he discharges, patient reports he feels he is very fortunate for this experience as he is ready to stop drinking and has a network of people in the community and AA meetings he is familiar with, which he plans to attend this weekend. Discharge instructions/ follow up labs reviewed with patient at the bedside. All questions are answered at this time. Discharge medications reviewed with patient. Patient discharged with all of his belongings.

## 2023-08-17 NOTE — PROGRESS NOTES
Pt transferred from [facility], has order for cardiac monitoring but refused. Education provided but still refused.

## 2023-08-19 ENCOUNTER — APPOINTMENT (OUTPATIENT)
Dept: ULTRASOUND IMAGING | Facility: CLINIC | Age: 58
End: 2023-08-19
Attending: EMERGENCY MEDICINE
Payer: COMMERCIAL

## 2023-08-19 ENCOUNTER — HOSPITAL ENCOUNTER (EMERGENCY)
Facility: CLINIC | Age: 58
Discharge: HOME | End: 2023-08-19
Attending: EMERGENCY MEDICINE | Admitting: EMERGENCY MEDICINE
Payer: COMMERCIAL

## 2023-08-19 VITALS
HEIGHT: 72 IN | WEIGHT: 265 LBS | OXYGEN SATURATION: 98 % | TEMPERATURE: 98.1 F | RESPIRATION RATE: 15 BRPM | DIASTOLIC BLOOD PRESSURE: 112 MMHG | SYSTOLIC BLOOD PRESSURE: 196 MMHG | HEART RATE: 95 BPM | BODY MASS INDEX: 35.89 KG/M2

## 2023-08-19 DIAGNOSIS — M79.89 LEFT LEG SWELLING: ICD-10-CM

## 2023-08-19 DIAGNOSIS — L03.116 CELLULITIS OF LEFT LEG: ICD-10-CM

## 2023-08-19 LAB
ANION GAP SERPL CALCULATED.3IONS-SCNC: 14 MMOL/L (ref 7–15)
BACTERIA BLD CULT: NO GROWTH
BACTERIA BLD CULT: NO GROWTH
BASOPHILS # BLD AUTO: 0.1 10E3/UL (ref 0–0.2)
BASOPHILS NFR BLD AUTO: 1 %
BUN SERPL-MCNC: 10.2 MG/DL (ref 6–20)
CALCIUM SERPL-MCNC: 9.5 MG/DL (ref 8.6–10)
CHLORIDE SERPL-SCNC: 95 MMOL/L (ref 98–107)
CREAT SERPL-MCNC: 0.79 MG/DL (ref 0.67–1.17)
DEPRECATED HCO3 PLAS-SCNC: 24 MMOL/L (ref 22–29)
EOSINOPHIL # BLD AUTO: 0.1 10E3/UL (ref 0–0.7)
EOSINOPHIL NFR BLD AUTO: 1 %
ERYTHROCYTE [DISTWIDTH] IN BLOOD BY AUTOMATED COUNT: 12.6 % (ref 10–15)
GFR SERPL CREATININE-BSD FRML MDRD: >90 ML/MIN/1.73M2
GLUCOSE SERPL-MCNC: 94 MG/DL (ref 70–99)
HCO3 BLDV-SCNC: 28 MMOL/L (ref 21–28)
HCT VFR BLD AUTO: 48.8 % (ref 40–53)
HGB BLD-MCNC: 17.3 G/DL (ref 13.3–17.7)
HOLD SPECIMEN: NORMAL
HOLD SPECIMEN: NORMAL
IMM GRANULOCYTES # BLD: 0.1 10E3/UL
IMM GRANULOCYTES NFR BLD: 1 %
LACTATE BLD-SCNC: 1 MMOL/L
LYMPHOCYTES # BLD AUTO: 1.6 10E3/UL (ref 0.8–5.3)
LYMPHOCYTES NFR BLD AUTO: 23 %
MCH RBC QN AUTO: 32.2 PG (ref 26.5–33)
MCHC RBC AUTO-ENTMCNC: 35.5 G/DL (ref 31.5–36.5)
MCV RBC AUTO: 91 FL (ref 78–100)
MONOCYTES # BLD AUTO: 0.6 10E3/UL (ref 0–1.3)
MONOCYTES NFR BLD AUTO: 9 %
NEUTROPHILS # BLD AUTO: 4.5 10E3/UL (ref 1.6–8.3)
NEUTROPHILS NFR BLD AUTO: 65 %
NRBC # BLD AUTO: 0 10E3/UL
NRBC BLD AUTO-RTO: 0 /100
PCO2 BLDV: 45 MM HG (ref 40–50)
PH BLDV: 7.41 [PH] (ref 7.32–7.43)
PLATELET # BLD AUTO: 174 10E3/UL (ref 150–450)
PO2 BLDV: 33 MM HG (ref 25–47)
POTASSIUM SERPL-SCNC: 4.5 MMOL/L (ref 3.4–5.3)
RBC # BLD AUTO: 5.38 10E6/UL (ref 4.4–5.9)
SAO2 % BLDV: 64 % (ref 94–100)
SODIUM SERPL-SCNC: 133 MMOL/L (ref 136–145)
WBC # BLD AUTO: 6.9 10E3/UL (ref 4–11)

## 2023-08-19 PROCEDURE — 82803 BLOOD GASES ANY COMBINATION: CPT

## 2023-08-19 PROCEDURE — 85025 COMPLETE CBC W/AUTO DIFF WBC: CPT | Performed by: EMERGENCY MEDICINE

## 2023-08-19 PROCEDURE — 82310 ASSAY OF CALCIUM: CPT | Performed by: EMERGENCY MEDICINE

## 2023-08-19 PROCEDURE — 83605 ASSAY OF LACTIC ACID: CPT

## 2023-08-19 PROCEDURE — 93971 EXTREMITY STUDY: CPT | Mod: LT

## 2023-08-19 PROCEDURE — 36415 COLL VENOUS BLD VENIPUNCTURE: CPT | Performed by: EMERGENCY MEDICINE

## 2023-08-19 PROCEDURE — 99284 EMERGENCY DEPT VISIT MOD MDM: CPT | Mod: 25

## 2023-08-19 ASSESSMENT — ACTIVITIES OF DAILY LIVING (ADL): ADLS_ACUITY_SCORE: 35

## 2023-08-19 NOTE — DISCHARGE INSTRUCTIONS
The cellulitis of the left leg looks to be healing appropriately.  The swelling in the leg is likely due to your infection that is being treated as well as some dependent edema and from IV fluids.  Your work-up today looks very reassuringly normal.  Follow-up with your doctor as scheduled on Wednesday and continue your antibiotics as discharged from the hospital.  Please return to the emergency room if there are new concerns about increasing redness, pain, swelling.

## 2023-08-19 NOTE — ED TRIAGE NOTES
Patient was discharged from the hospital room Thursday for cellulitis.  Complaining of increased redness and swelling to the affected leg.

## 2023-08-19 NOTE — ED PROVIDER NOTES
History     Chief Complaint:  Leg Swelling       The history is provided by the patient.      Jorge Alberto Ernandez is a 58 year old male who presents with leg and ankle swelling. The patient reports that he was discharged from the hospital 2 days ago after experienced left lower leg cellulitis. He initially presented to the ED to seek evaluation for upper and lower extremity weakness and alcohol withdrawal symptoms. He explains that on the day of his discharge the cellulitis had resolved but he notes that is has since returned to his left lower extremity. He states that the area is painful to the touch. He denies experiencing pain with walking or any cellulitis or pain to his right ankle but he notes that his left ankle appears swollen. He denies experiencing illnesses since discharge or any symptoms of cough, pneumonia, diarrhea, chest pain, or shortness of breath.     Independent Historian:   None - Patient Only    Review of External Notes:   He was admitted here from 8/14-8/17/2023 for cellulitis of the left leg. He was discharged 2 days ago with a prescription for Keflex.    Medications:    Albuterol inhaler  Cephalexin  Gabapentin  Hydrochlorothiazide  Hydroxyzine  Trazodone    Past Medical History:    Alcohol abuse with alcohol withdrawal seizure and complication  Anxiety  Asthma  Cellulitis of left lower extremity  Hyperlipidemia  Hypertension  Hypocalcemia   Hypokalemia  Hypomagnesemia  Impingement syndrome of left shoulder  Major depressive disorder, single episode, moderate    Past Surgical History:    Sinus procedure     Physical Exam   Patient Vitals for the past 24 hrs:   BP Temp Temp src Pulse Resp SpO2 Height Weight   08/19/23 1428 (!) 196/112 -- -- 95 15 98 % -- --   08/19/23 1131 (!) 186/115 98.1  F (36.7  C) Temporal 94 16 97 % 1.829 m (6') 120.2 kg (265 lb)        Physical Exam    Physical Exam   Constitutional:  Patient is oriented to person, place, and time. They appear well-developed and  well-nourished.  HENT:   Eyes:    Conjunctivae normal and EOM are normal. Pupils are equal, round, and reactive to light.   Neck:    Normal range of motion.   Cardiovascular: Normal rate, regular rhythm and normal heart sounds.  Exam reveals no gallop and no friction rub.  No murmur heard.  Pulmonary/Chest:  Effort normal and breath sounds normal. Patient has no wheezes. Patient has no rales.     Musculoskeletal:  Normal range of motion. Patient exhibits trace left pedal edema.   Neurological:   No tremulousness patient is alert and oriented to person, place, and time. Patient has normal strength. No cranial nerve deficit or sensory deficit. GCS 15  Skin:   Left lower extremity shows some faint redness.  There is a previously outlined marking area from his cellulitis of previous hospitalization and the area of redness has significantly receded from this.  Psychiatric:   Patient has a normal mood and affect. Patient's behavior is normal. Judgment and thought content normal.         Emergency Department Course     Imaging:  US Lower Extremity Venous Duplex Left   Final Result   IMPRESSION:   1.  No deep venous thrombosis in the left lower extremity.         Report per radiology    Laboratory:  Labs Ordered and Resulted from Time of ED Arrival to Time of ED Departure   BASIC METABOLIC PANEL - Abnormal       Result Value    Sodium 133 (*)     Potassium 4.5      Chloride 95 (*)     Carbon Dioxide (CO2) 24      Anion Gap 14      Urea Nitrogen 10.2      Creatinine 0.79      Calcium 9.5      Glucose 94      GFR Estimate >90     ISTAT GASES LACTATE VENOUS POCT - Abnormal    Lactic Acid POCT 1.0      Bicarbonate Venous POCT 28      O2 Sat, Venous POCT 64 (*)     pCO2 Venous POCT 45      pH Venous POCT 7.41      pO2 Venous POCT 33     CBC WITH PLATELETS AND DIFFERENTIAL    WBC Count 6.9      RBC Count 5.38      Hemoglobin 17.3      Hematocrit 48.8      MCV 91      MCH 32.2      MCHC 35.5      RDW 12.6      Platelet Count 174       % Neutrophils 65      % Lymphocytes 23      % Monocytes 9      % Eosinophils 1      % Basophils 1      % Immature Granulocytes 1      NRBCs per 100 WBC 0      Absolute Neutrophils 4.5      Absolute Lymphocytes 1.6      Absolute Monocytes 0.6      Absolute Eosinophils 0.1      Absolute Basophils 0.1      Absolute Immature Granulocytes 0.1      Absolute NRBCs 0.0          Emergency Department Course & Assessments:    Interventions:  Medications - No data to display     Assessments:  1412 I obtained history and examined the patient.  1637   rechecked the patient.  Reviewed imaging results and blood work.    Independent Interpretation (X-rays, CTs, rhythm strip):  None    Consultations/Discussion of Management or Tests:  None     Social Determinants of Health affecting care:   None    Disposition:  The patient was discharged to home.     Impression & Plan      Medical Decision Making:  Jorge Alberto Ernandez is a 58-year-old gentleman presenting to the emergency room with concern for swelling of his left leg.  He feels that it is a little bit more red.  His physical exam was very reassuring and that it was faintly red but significantly diminished from the outline by 75% or so there was some mild edema of the left leg.  Ultrasound and imaging was obtained.  An ultrasound was leg fortunately shows no evidence of DVT.  There is no fluid collection or concern for abscess.  Blood work also shows a normal lactic acid and a normal white blood cell count.  I feel that his cellulitis is appropriately resolving.  He is on Keflex 4 times daily.  He has an appoint with his primary care doctor on Wednesday.  At this point I will have him follow-up.  I feel that he does not require hospitalization as his healing course is appropriate.  Return to the emergency room if there is new or concerning symptoms.  At this time it is noted that he is significantly hypertensive but he is now not showing any signs of alcohol withdrawal.      Diagnosis:     ICD-10-CM    1. Left leg swelling  M79.89       2. Cellulitis of left leg  L03.116     improving           Discharge Medications:  Discharge Medication List as of 8/19/2023  4:42 PM           Scribe Disclosure:  I, Camelia Valentin, am serving as a scribe at 3:15 PM on 8/19/2023 to document services personally performed by Yesika Benton MD based on my observations and the provider's statements to me.        Yesika Benton MD  08/19/23 1742

## 2024-04-06 ENCOUNTER — HEALTH MAINTENANCE LETTER (OUTPATIENT)
Age: 59
End: 2024-04-06

## 2025-04-13 ENCOUNTER — HEALTH MAINTENANCE LETTER (OUTPATIENT)
Age: 60
End: 2025-04-13